# Patient Record
Sex: MALE | Race: BLACK OR AFRICAN AMERICAN | Employment: OTHER | URBAN - METROPOLITAN AREA
[De-identification: names, ages, dates, MRNs, and addresses within clinical notes are randomized per-mention and may not be internally consistent; named-entity substitution may affect disease eponyms.]

---

## 2017-06-03 ENCOUNTER — HOSPITAL ENCOUNTER (OUTPATIENT)
Dept: LAB | Age: 67
Discharge: HOME | End: 2017-06-03
Payer: MEDICARE

## 2017-06-03 PROCEDURE — 36415 COLL VENOUS BLD VENIPUNCTURE: CPT

## 2017-06-03 PROCEDURE — G0103 PSA SCREENING: HCPCS

## 2017-06-03 PROCEDURE — 80048 BASIC METABOLIC PNL TOTAL CA: CPT

## 2017-06-03 PROCEDURE — 80061 LIPID PANEL: CPT

## 2017-06-27 ENCOUNTER — HOSPITAL ENCOUNTER (OUTPATIENT)
Age: 67
Setting detail: OUTPATIENT SURGERY
Discharge: HOME | End: 2017-06-27
Attending: SPECIALIST | Admitting: SPECIALIST
Payer: MEDICARE

## 2017-06-27 ENCOUNTER — ANESTHESIA EVENT (OUTPATIENT)
Dept: ENDOSCOPY | Age: 67
End: 2017-06-27
Payer: MEDICARE

## 2017-06-27 ENCOUNTER — ANESTHESIA (OUTPATIENT)
Dept: ENDOSCOPY | Age: 67
End: 2017-06-27
Payer: MEDICARE

## 2017-06-27 VITALS
RESPIRATION RATE: 19 BRPM | OXYGEN SATURATION: 98 % | SYSTOLIC BLOOD PRESSURE: 115 MMHG | DIASTOLIC BLOOD PRESSURE: 76 MMHG

## 2017-06-27 VITALS
HEIGHT: 66 IN | HEART RATE: 73 BPM | BODY MASS INDEX: 27.97 KG/M2 | SYSTOLIC BLOOD PRESSURE: 129 MMHG | DIASTOLIC BLOOD PRESSURE: 89 MMHG | TEMPERATURE: 98.3 F | OXYGEN SATURATION: 97 % | WEIGHT: 174 LBS | RESPIRATION RATE: 20 BRPM

## 2017-06-27 PROCEDURE — 88305 TISSUE EXAM BY PATHOLOGIST: CPT

## 2017-06-27 PROCEDURE — 2500000003 HC RX 250 WO HCPCS: Performed by: NURSE ANESTHETIST, CERTIFIED REGISTERED

## 2017-06-27 PROCEDURE — 3700000000 HC ANESTHESIA ATTENDED CARE: Performed by: SPECIALIST

## 2017-06-27 PROCEDURE — 2580000003 HC RX 258: Performed by: SPECIALIST

## 2017-06-27 PROCEDURE — 3609027000 HC COLONOSCOPY: Performed by: SPECIALIST

## 2017-06-27 PROCEDURE — 6360000002 HC RX W HCPCS: Performed by: NURSE ANESTHETIST, CERTIFIED REGISTERED

## 2017-06-27 PROCEDURE — 7100000010 HC PHASE II RECOVERY - FIRST 15 MIN: Performed by: SPECIALIST

## 2017-06-27 RX ORDER — LOVASTATIN 40 MG/1
40 TABLET ORAL NIGHTLY
COMMUNITY

## 2017-06-27 RX ORDER — SODIUM CHLORIDE 9 MG/ML
INJECTION, SOLUTION INTRAVENOUS CONTINUOUS
Status: DISCONTINUED | OUTPATIENT
Start: 2017-06-27 | End: 2017-06-27 | Stop reason: HOSPADM

## 2017-06-27 RX ORDER — PROPOFOL 10 MG/ML
INJECTION, EMULSION INTRAVENOUS PRN
Status: DISCONTINUED | OUTPATIENT
Start: 2017-06-27 | End: 2017-06-27 | Stop reason: SDUPTHER

## 2017-06-27 RX ORDER — LIDOCAINE HYDROCHLORIDE 20 MG/ML
INJECTION, SOLUTION INFILTRATION; PERINEURAL PRN
Status: DISCONTINUED | OUTPATIENT
Start: 2017-06-27 | End: 2017-06-27 | Stop reason: SDUPTHER

## 2017-06-27 RX ORDER — LOSARTAN POTASSIUM 50 MG/1
50 TABLET ORAL DAILY
COMMUNITY

## 2017-06-27 RX ORDER — MULTIVITAMIN WITH IRON
100 TABLET ORAL DAILY
COMMUNITY

## 2017-06-27 RX ORDER — LIDOCAINE HYDROCHLORIDE 10 MG/ML
1 INJECTION, SOLUTION EPIDURAL; INFILTRATION; INTRACAUDAL; PERINEURAL
Status: DISCONTINUED | OUTPATIENT
Start: 2017-06-27 | End: 2017-06-27 | Stop reason: HOSPADM

## 2017-06-27 RX ORDER — SODIUM CHLORIDE 0.9 % (FLUSH) 0.9 %
10 SYRINGE (ML) INJECTION EVERY 12 HOURS SCHEDULED
Status: DISCONTINUED | OUTPATIENT
Start: 2017-06-27 | End: 2017-06-27 | Stop reason: HOSPADM

## 2017-06-27 RX ORDER — SODIUM CHLORIDE 0.9 % (FLUSH) 0.9 %
10 SYRINGE (ML) INJECTION PRN
Status: DISCONTINUED | OUTPATIENT
Start: 2017-06-27 | End: 2017-06-27 | Stop reason: HOSPADM

## 2017-06-27 RX ADMIN — SODIUM CHLORIDE: 9 INJECTION, SOLUTION INTRAVENOUS at 12:03

## 2017-06-27 RX ADMIN — PROPOFOL 20 MG: 10 INJECTION, EMULSION INTRAVENOUS at 13:16

## 2017-06-27 RX ADMIN — PROPOFOL 20 MG: 10 INJECTION, EMULSION INTRAVENOUS at 13:18

## 2017-06-27 RX ADMIN — PROPOFOL 20 MG: 10 INJECTION, EMULSION INTRAVENOUS at 13:22

## 2017-06-27 RX ADMIN — LIDOCAINE HYDROCHLORIDE 20 MG: 20 INJECTION, SOLUTION INFILTRATION; PERINEURAL at 13:14

## 2017-06-27 RX ADMIN — PROPOFOL 20 MG: 10 INJECTION, EMULSION INTRAVENOUS at 13:20

## 2017-06-27 RX ADMIN — PROPOFOL 30 MG: 10 INJECTION, EMULSION INTRAVENOUS at 13:14

## 2017-06-27 ASSESSMENT — PAIN - FUNCTIONAL ASSESSMENT: PAIN_FUNCTIONAL_ASSESSMENT: 0-10

## 2017-06-28 ENCOUNTER — HOSPITAL ENCOUNTER (OUTPATIENT)
Dept: LAB | Age: 67
Discharge: HOME | End: 2017-06-28
Payer: MEDICARE

## 2017-06-28 LAB
ANION GAP SERPL CALCULATED.3IONS-SCNC: 13 MEQ/L (ref 7–13)
CHLORIDE BLD-SCNC: 102 MEQ/L (ref 98–107)
CO2: 22 MEQ/L (ref 22–29)
POTASSIUM SERPL-SCNC: 4.2 MEQ/L (ref 3.5–5.1)
SODIUM BLD-SCNC: 137 MEQ/L (ref 132–144)

## 2017-06-28 PROCEDURE — 80051 ELECTROLYTE PANEL: CPT

## 2017-06-28 PROCEDURE — 36415 COLL VENOUS BLD VENIPUNCTURE: CPT

## 2018-08-03 ENCOUNTER — HOSPITAL ENCOUNTER (OUTPATIENT)
Dept: LAB | Age: 68
Discharge: HOME | End: 2018-08-03
Payer: COMMERCIAL

## 2018-08-03 PROCEDURE — 80061 LIPID PANEL: CPT

## 2018-08-03 PROCEDURE — 36415 COLL VENOUS BLD VENIPUNCTURE: CPT

## 2018-08-03 PROCEDURE — 80048 BASIC METABOLIC PNL TOTAL CA: CPT

## 2018-12-02 ENCOUNTER — APPOINTMENT (OUTPATIENT)
Dept: GENERAL RADIOLOGY | Age: 68
End: 2018-12-02
Payer: MEDICARE

## 2018-12-02 ENCOUNTER — HOSPITAL ENCOUNTER (EMERGENCY)
Age: 68
Discharge: HOME | End: 2018-12-02
Attending: EMERGENCY MEDICINE
Payer: MEDICARE

## 2018-12-02 VITALS
HEIGHT: 66 IN | TEMPERATURE: 97.8 F | HEART RATE: 86 BPM | DIASTOLIC BLOOD PRESSURE: 98 MMHG | RESPIRATION RATE: 18 BRPM | OXYGEN SATURATION: 97 % | WEIGHT: 170 LBS | BODY MASS INDEX: 27.32 KG/M2 | SYSTOLIC BLOOD PRESSURE: 145 MMHG

## 2018-12-02 DIAGNOSIS — S61.209A OPEN WOUND OF FINGER, INITIAL ENCOUNTER: ICD-10-CM

## 2018-12-02 DIAGNOSIS — S62.639G: Primary | ICD-10-CM

## 2018-12-02 LAB
ALBUMIN SERPL-MCNC: 4.2 G/DL (ref 3.9–4.9)
ALP BLD-CCNC: 90 U/L (ref 35–104)
ALT SERPL-CCNC: 20 U/L (ref 0–41)
ANION GAP SERPL CALCULATED.3IONS-SCNC: 14 MEQ/L (ref 7–13)
AST SERPL-CCNC: 18 U/L (ref 0–40)
BASOPHILS ABSOLUTE: 0 K/UL (ref 0–0.2)
BASOPHILS RELATIVE PERCENT: 0.2 %
BILIRUB SERPL-MCNC: 0.7 MG/DL (ref 0–1.2)
BUN BLDV-MCNC: 17 MG/DL (ref 8–23)
CALCIUM SERPL-MCNC: 9.5 MG/DL (ref 8.6–10.2)
CHLORIDE BLD-SCNC: 103 MEQ/L (ref 98–107)
CO2: 22 MEQ/L (ref 22–29)
CREAT SERPL-MCNC: 1.09 MG/DL (ref 0.7–1.2)
EOSINOPHILS ABSOLUTE: 0.2 K/UL (ref 0–0.7)
EOSINOPHILS RELATIVE PERCENT: 2.5 %
GFR AFRICAN AMERICAN: >60
GFR NON-AFRICAN AMERICAN: >60
GLOBULIN: 3.9 G/DL (ref 2.3–3.5)
GLUCOSE BLD-MCNC: 122 MG/DL (ref 74–109)
HCT VFR BLD CALC: 42.9 % (ref 42–52)
HEMOGLOBIN: 14 G/DL (ref 14–18)
LACTIC ACID: 0.9 MMOL/L (ref 0.5–2.2)
LYMPHOCYTES ABSOLUTE: 1.7 K/UL (ref 1–4.8)
LYMPHOCYTES RELATIVE PERCENT: 18.6 %
MAGNESIUM: 2.1 MG/DL (ref 1.7–2.3)
MCH RBC QN AUTO: 30.1 PG (ref 27–31.3)
MCHC RBC AUTO-ENTMCNC: 32.6 % (ref 33–37)
MCV RBC AUTO: 92.2 FL (ref 80–100)
MONOCYTES ABSOLUTE: 0.7 K/UL (ref 0.2–0.8)
MONOCYTES RELATIVE PERCENT: 7.1 %
NEUTROPHILS ABSOLUTE: 6.7 K/UL (ref 1.4–6.5)
NEUTROPHILS RELATIVE PERCENT: 71.6 %
PDW BLD-RTO: 14.8 % (ref 11.5–14.5)
PLATELET # BLD: 241 K/UL (ref 130–400)
POTASSIUM SERPL-SCNC: 3.9 MEQ/L (ref 3.5–5.1)
RBC # BLD: 4.66 M/UL (ref 4.7–6.1)
SODIUM BLD-SCNC: 139 MEQ/L (ref 132–144)
TOTAL PROTEIN: 8.1 G/DL (ref 6.4–8.1)
WBC # BLD: 9.3 K/UL (ref 4.8–10.8)

## 2018-12-02 PROCEDURE — 36415 COLL VENOUS BLD VENIPUNCTURE: CPT

## 2018-12-02 PROCEDURE — 2580000003 HC RX 258: Performed by: EMERGENCY MEDICINE

## 2018-12-02 PROCEDURE — 99283 EMERGENCY DEPT VISIT LOW MDM: CPT

## 2018-12-02 PROCEDURE — 6370000000 HC RX 637 (ALT 250 FOR IP): Performed by: EMERGENCY MEDICINE

## 2018-12-02 PROCEDURE — 83605 ASSAY OF LACTIC ACID: CPT

## 2018-12-02 PROCEDURE — 73140 X-RAY EXAM OF FINGER(S): CPT

## 2018-12-02 PROCEDURE — 87205 SMEAR GRAM STAIN: CPT

## 2018-12-02 PROCEDURE — 87077 CULTURE AEROBIC IDENTIFY: CPT

## 2018-12-02 PROCEDURE — 85025 COMPLETE CBC W/AUTO DIFF WBC: CPT

## 2018-12-02 PROCEDURE — 83735 ASSAY OF MAGNESIUM: CPT

## 2018-12-02 PROCEDURE — 87075 CULTR BACTERIA EXCEPT BLOOD: CPT

## 2018-12-02 PROCEDURE — 80053 COMPREHEN METABOLIC PANEL: CPT

## 2018-12-02 PROCEDURE — 87070 CULTURE OTHR SPECIMN AEROBIC: CPT

## 2018-12-02 PROCEDURE — 87147 CULTURE TYPE IMMUNOLOGIC: CPT

## 2018-12-02 PROCEDURE — 87186 SC STD MICRODIL/AGAR DIL: CPT

## 2018-12-02 PROCEDURE — 87040 BLOOD CULTURE FOR BACTERIA: CPT

## 2018-12-02 RX ORDER — SULFAMETHOXAZOLE AND TRIMETHOPRIM 800; 160 MG/1; MG/1
1 TABLET ORAL ONCE
Status: COMPLETED | OUTPATIENT
Start: 2018-12-02 | End: 2018-12-02

## 2018-12-02 RX ORDER — IBUPROFEN 200 MG
TABLET ORAL
Qty: 28 G | Refills: 1 | Status: SHIPPED | OUTPATIENT
Start: 2018-12-02

## 2018-12-02 RX ORDER — SULFAMETHOXAZOLE AND TRIMETHOPRIM 800; 160 MG/1; MG/1
1 TABLET ORAL 2 TIMES DAILY
Qty: 20 TABLET | Refills: 0 | Status: SHIPPED | OUTPATIENT
Start: 2018-12-02 | End: 2018-12-12

## 2018-12-02 RX ORDER — 0.9 % SODIUM CHLORIDE 0.9 %
1000 INTRAVENOUS SOLUTION INTRAVENOUS ONCE
Status: COMPLETED | OUTPATIENT
Start: 2018-12-02 | End: 2018-12-02

## 2018-12-02 RX ORDER — CHLORHEXIDINE GLUCONATE 4 G/100ML
SOLUTION TOPICAL
Qty: 473 ML | Refills: 0 | Status: SHIPPED | OUTPATIENT
Start: 2018-12-02 | End: 2018-12-16

## 2018-12-02 RX ADMIN — SODIUM CHLORIDE 1000 ML: 9 INJECTION, SOLUTION INTRAVENOUS at 16:08

## 2018-12-02 RX ADMIN — SULFAMETHOXAZOLE AND TRIMETHOPRIM 1 TABLET: 800; 160 TABLET ORAL at 18:01

## 2018-12-02 ASSESSMENT — ENCOUNTER SYMPTOMS
DIARRHEA: 0
WHEEZING: 0
APNEA: 0
CONSTIPATION: 0
COLOR CHANGE: 0
EYE PAIN: 0
ABDOMINAL DISTENTION: 0
SHORTNESS OF BREATH: 0
ABDOMINAL PAIN: 0
BACK PAIN: 0
RHINORRHEA: 0
VOMITING: 0
COUGH: 0
SORE THROAT: 0
SINUS PRESSURE: 0
NAUSEA: 0
PHOTOPHOBIA: 0

## 2018-12-02 NOTE — ED PROVIDER NOTES
Negative for dizziness, tremors, syncope, weakness, light-headedness and headaches. Psychiatric/Behavioral: Negative for agitation, confusion and hallucinations. All other systems reviewed and are negative. Except as noted above the remainder of the review of systems was reviewed and negative. PAST MEDICAL HISTORY     Past Medical History:   Diagnosis Date    Hyperlipidemia     Hypertension          SURGICAL HISTORY       Past Surgical History:   Procedure Laterality Date    COLONOSCOPY      JOINT REPLACEMENT Left     hip '08    [place] COLON [place] SCRN NOT  [address] IND N/A 6/27/2017    COLONOSCOPY performed by Angeles Holley MD at [address]       Discharge Medication List as of 12/2/2018  5:52 PM      CONTINUE these medications which have NOT CHANGED    Details   losartan (COZAAR) 50 MG tablet Take 50 mg by mouth dailyHistorical Med      HYDROCHLOROTHIAZIDE PO Take by mouthHistorical Med      aspirin 81 MG tablet Take 81 mg by mouth dailyHistorical Med      lovastatin (MEVACOR) 40 MG tablet Take 40 mg by mouth nightlyHistorical Med      Multiple Vitamins-Minerals (ONE-A-DAY MENS 50+ ADVANTAGE PO) Take by mouthHistorical Med      Naproxen Sodium (ALEVE PO) Take by mouthHistorical Med      Pyridoxine HCl (VITAMIN B-6) 100 MG tablet Take 100 mg by mouth dailyHistorical Med             ALLERGIES     Patient has no known allergies. FAMILY HISTORY     No family history on file.        SOCIAL HISTORY       Social History     Social History    Marital status:      Spouse name: N/A    Number of children: N/A    Years of education: N/A     Social History Main Topics    Smoking status: Current Every Day Smoker     Types: Pipe    Smokeless tobacco: Not on file    Alcohol use No    Drug use: No    Sexual activity: Not on file     Other Topics Concern    Not on file     Social History Narrative    No narrative on file       SCREENINGS             PHYSICAL EXAM #2    Narrative:     ORDER#: [id number]                          ORDERED BY: Kamilah Cosme  SOURCE: Blood                              COLLECTED:  12/02/18 16:00  ANTIBIOTICS AT [facility].:                      RECEIVED :  12/02/18 21:27   MAGNESIUM   LACTIC ACID, PLASMA       All other labs were within normal range or not returned as of this dictation. EMERGENCY DEPARTMENT COURSE and DIFFERENTIALDIAGNOSIS/MDM:   Vitals:    Vitals:    12/02/18 1514 12/02/18 1632 12/02/18 1824   BP: (!) 164/107 (!) 160/100 (!) 145/98   Pulse: 106 86 86   Resp: 16 18 18   Temp: 97.8 °F (36.6 °C)     TempSrc: Oral     SpO2: 97% 96% 97%   Weight: 170 lb (77.1 kg)     Height: 5' 6\" (1.676 m)             MDM  Number of Diagnoses or Management Options     Amount and/or Complexity of Data Reviewed  Clinical lab tests: reviewed and ordered  Tests in the radiology section of CPT®: reviewed and ordered    Risk of Complications, Morbidity, and/or Mortality  Presenting problems: moderate  Diagnostic procedures: moderate  Management options: moderate    Patient Progress  Patient progress: improved      CRITICAL CARE TIME   Total Critical Care time was  minutes, excluding separately reportable procedures. There was a high probability of clinically significant/life threatening deterioration in the patient's condition which required my urgentintervention. CONSULTS:  None    PROCEDURES:  Unless otherwise noted below, none     Procedures    FINAL IMPRESSION      1. Open fracture of tuft of distal phalanx of finger with delayed healing    2.  Open wound of finger, initial encounter          DISPOSITION/PLAN   DISPOSITION Decision To Discharge 12/02/2018 05:48:38 PM      PATIENT REFERRED TO:  Rony Winters MD  [address] [address] [phone]    In 3 days      Jimmy Beltre MD  [address] [place] [address]    In 1 day        DISCHARGE MEDICATIONS:  Discharge Medication List as of 12/2/2018  5:52 PM

## 2018-12-02 NOTE — ED NOTES
Pt middle finger swollen and draining from tip of finger.  Pt denies pain at this time     Huseyin Shea RN  12/02/18 3619

## 2018-12-05 LAB
ANAEROBIC CULTURE: ABNORMAL
GRAM STAIN RESULT: ABNORMAL
ORGANISM: ABNORMAL
WOUND/ABSCESS: ABNORMAL

## 2018-12-08 LAB
BLOOD CULTURE, ROUTINE: NORMAL
CULTURE, BLOOD 2: NORMAL

## 2018-12-24 ENCOUNTER — HOSPITAL ENCOUNTER (OUTPATIENT)
Dept: ORTHOPEDIC SURGERY | Age: 68
Discharge: HOME | End: 2018-12-26
Payer: MEDICARE

## 2018-12-24 DIAGNOSIS — S62.663D CLOSED NONDISPLACED FRACTURE OF DISTAL PHALANX OF LEFT MIDDLE FINGER WITH ROUTINE HEALING, SUBSEQUENT ENCOUNTER: ICD-10-CM

## 2018-12-24 PROCEDURE — 73140 X-RAY EXAM OF FINGER(S): CPT

## 2019-01-02 ENCOUNTER — ANESTHESIA (OUTPATIENT)
Dept: OPERATING ROOM | Age: 69
End: 2019-01-02
Payer: MEDICARE

## 2019-01-02 ENCOUNTER — ANESTHESIA EVENT (OUTPATIENT)
Dept: OPERATING ROOM | Age: 69
End: 2019-01-02
Payer: MEDICARE

## 2019-01-02 ENCOUNTER — HOSPITAL ENCOUNTER (OUTPATIENT)
Age: 69
Setting detail: OUTPATIENT SURGERY
Discharge: HOME | End: 2019-01-02
Attending: ORTHOPAEDIC SURGERY | Admitting: ORTHOPAEDIC SURGERY
Payer: MEDICARE

## 2019-01-02 ENCOUNTER — APPOINTMENT (OUTPATIENT)
Dept: GENERAL RADIOLOGY | Age: 69
End: 2019-01-02
Attending: ORTHOPAEDIC SURGERY
Payer: MEDICARE

## 2019-01-02 VITALS — SYSTOLIC BLOOD PRESSURE: 163 MMHG | OXYGEN SATURATION: 97 % | DIASTOLIC BLOOD PRESSURE: 106 MMHG

## 2019-01-02 VITALS
RESPIRATION RATE: 16 BRPM | DIASTOLIC BLOOD PRESSURE: 109 MMHG | OXYGEN SATURATION: 97 % | WEIGHT: 187 LBS | TEMPERATURE: 98.7 F | SYSTOLIC BLOOD PRESSURE: 160 MMHG | HEIGHT: 66 IN | HEART RATE: 98 BPM | BODY MASS INDEX: 30.05 KG/M2

## 2019-01-02 PROCEDURE — 3700000001 HC ADD 15 MINUTES (ANESTHESIA): Performed by: ORTHOPAEDIC SURGERY

## 2019-01-02 PROCEDURE — 3600000002 HC SURGERY LEVEL 2 BASE: Performed by: ORTHOPAEDIC SURGERY

## 2019-01-02 PROCEDURE — 88311 DECALCIFY TISSUE: CPT

## 2019-01-02 PROCEDURE — 2580000003 HC RX 258: Performed by: ANESTHESIOLOGY

## 2019-01-02 PROCEDURE — 7100000011 HC PHASE II RECOVERY - ADDTL 15 MIN: Performed by: ORTHOPAEDIC SURGERY

## 2019-01-02 PROCEDURE — 87075 CULTR BACTERIA EXCEPT BLOOD: CPT

## 2019-01-02 PROCEDURE — 3700000000 HC ANESTHESIA ATTENDED CARE: Performed by: ORTHOPAEDIC SURGERY

## 2019-01-02 PROCEDURE — 87205 SMEAR GRAM STAIN: CPT

## 2019-01-02 PROCEDURE — 2709999900 HC NON-CHARGEABLE SUPPLY: Performed by: ORTHOPAEDIC SURGERY

## 2019-01-02 PROCEDURE — 87070 CULTURE OTHR SPECIMN AEROBIC: CPT

## 2019-01-02 PROCEDURE — 2500000003 HC RX 250 WO HCPCS: Performed by: NURSE ANESTHETIST, CERTIFIED REGISTERED

## 2019-01-02 PROCEDURE — 2580000003 HC RX 258: Performed by: ORTHOPAEDIC SURGERY

## 2019-01-02 PROCEDURE — 7100000010 HC PHASE II RECOVERY - FIRST 15 MIN: Performed by: ORTHOPAEDIC SURGERY

## 2019-01-02 PROCEDURE — 88305 TISSUE EXAM BY PATHOLOGIST: CPT

## 2019-01-02 PROCEDURE — 6360000002 HC RX W HCPCS: Performed by: ORTHOPAEDIC SURGERY

## 2019-01-02 PROCEDURE — 3600000012 HC SURGERY LEVEL 2 ADDTL 15MIN: Performed by: ORTHOPAEDIC SURGERY

## 2019-01-02 RX ORDER — LIDOCAINE HYDROCHLORIDE 10 MG/ML
1 INJECTION, SOLUTION EPIDURAL; INFILTRATION; INTRACAUDAL; PERINEURAL
Status: DISCONTINUED | OUTPATIENT
Start: 2019-01-02 | End: 2019-01-02 | Stop reason: HOSPADM

## 2019-01-02 RX ORDER — MEPERIDINE HYDROCHLORIDE 25 MG/ML
12.5 INJECTION INTRAMUSCULAR; INTRAVENOUS; SUBCUTANEOUS EVERY 5 MIN PRN
Status: DISCONTINUED | OUTPATIENT
Start: 2019-01-02 | End: 2019-01-02 | Stop reason: HOSPADM

## 2019-01-02 RX ORDER — CEFAZOLIN SODIUM 2 G/50ML
2 SOLUTION INTRAVENOUS
Status: COMPLETED | OUTPATIENT
Start: 2019-01-02 | End: 2019-01-02

## 2019-01-02 RX ORDER — FENTANYL CITRATE 50 UG/ML
50 INJECTION, SOLUTION INTRAMUSCULAR; INTRAVENOUS EVERY 10 MIN PRN
Status: DISCONTINUED | OUTPATIENT
Start: 2019-01-02 | End: 2019-01-02 | Stop reason: HOSPADM

## 2019-01-02 RX ORDER — LABETALOL HYDROCHLORIDE 5 MG/ML
INJECTION, SOLUTION INTRAVENOUS PRN
Status: DISCONTINUED | OUTPATIENT
Start: 2019-01-02 | End: 2019-01-02 | Stop reason: SDUPTHER

## 2019-01-02 RX ORDER — HYDROCODONE BITARTRATE AND ACETAMINOPHEN 5; 325 MG/1; MG/1
2 TABLET ORAL PRN
Status: DISCONTINUED | OUTPATIENT
Start: 2019-01-02 | End: 2019-01-02 | Stop reason: HOSPADM

## 2019-01-02 RX ORDER — METOCLOPRAMIDE HYDROCHLORIDE 5 MG/ML
10 INJECTION INTRAMUSCULAR; INTRAVENOUS
Status: DISCONTINUED | OUTPATIENT
Start: 2019-01-02 | End: 2019-01-02 | Stop reason: HOSPADM

## 2019-01-02 RX ORDER — HYDROCODONE BITARTRATE AND ACETAMINOPHEN 5; 325 MG/1; MG/1
1 TABLET ORAL PRN
Status: DISCONTINUED | OUTPATIENT
Start: 2019-01-02 | End: 2019-01-02 | Stop reason: HOSPADM

## 2019-01-02 RX ORDER — DIPHENHYDRAMINE HYDROCHLORIDE 50 MG/ML
12.5 INJECTION INTRAMUSCULAR; INTRAVENOUS
Status: DISCONTINUED | OUTPATIENT
Start: 2019-01-02 | End: 2019-01-02 | Stop reason: HOSPADM

## 2019-01-02 RX ORDER — LIDOCAINE HYDROCHLORIDE 10 MG/ML
INJECTION, SOLUTION INFILTRATION; PERINEURAL
Status: DISCONTINUED
Start: 2019-01-02 | End: 2019-01-02 | Stop reason: HOSPADM

## 2019-01-02 RX ORDER — ONDANSETRON 2 MG/ML
4 INJECTION INTRAMUSCULAR; INTRAVENOUS
Status: DISCONTINUED | OUTPATIENT
Start: 2019-01-02 | End: 2019-01-02 | Stop reason: HOSPADM

## 2019-01-02 RX ORDER — MAGNESIUM HYDROXIDE 1200 MG/15ML
LIQUID ORAL CONTINUOUS PRN
Status: DISCONTINUED | OUTPATIENT
Start: 2019-01-02 | End: 2019-01-02 | Stop reason: HOSPADM

## 2019-01-02 RX ORDER — SODIUM CHLORIDE, SODIUM LACTATE, POTASSIUM CHLORIDE, CALCIUM CHLORIDE 600; 310; 30; 20 MG/100ML; MG/100ML; MG/100ML; MG/100ML
INJECTION, SOLUTION INTRAVENOUS CONTINUOUS
Status: DISCONTINUED | OUTPATIENT
Start: 2019-01-02 | End: 2019-01-02 | Stop reason: HOSPADM

## 2019-01-02 RX ADMIN — LABETALOL HYDROCHLORIDE 5 MG: 5 INJECTION, SOLUTION INTRAVENOUS at 08:16

## 2019-01-02 RX ADMIN — CEFAZOLIN SODIUM 2 G: 2 SOLUTION INTRAVENOUS at 08:05

## 2019-01-02 RX ADMIN — LABETALOL HYDROCHLORIDE 5 MG: 5 INJECTION, SOLUTION INTRAVENOUS at 08:30

## 2019-01-02 RX ADMIN — SODIUM CHLORIDE, POTASSIUM CHLORIDE, SODIUM LACTATE AND CALCIUM CHLORIDE: 600; 310; 30; 20 INJECTION, SOLUTION INTRAVENOUS at 07:10

## 2019-01-02 ASSESSMENT — PULMONARY FUNCTION TESTS
PIF_VALUE: 0
PIF_VALUE: 1
PIF_VALUE: 0
PIF_VALUE: 1
PIF_VALUE: 0

## 2019-01-02 ASSESSMENT — PAIN - FUNCTIONAL ASSESSMENT: PAIN_FUNCTIONAL_ASSESSMENT: 0-10

## 2019-01-02 ASSESSMENT — PAIN SCALES - GENERAL: PAINLEVEL_OUTOF10: 0

## 2019-01-05 LAB
ANAEROBIC CULTURE: NORMAL
CULTURE SURGICAL: NORMAL
GRAM STAIN RESULT: NORMAL

## 2019-01-14 ENCOUNTER — HOSPITAL ENCOUNTER (OUTPATIENT)
Dept: ORTHOPEDIC SURGERY | Age: 69
Discharge: HOME | End: 2019-01-16
Payer: MEDICARE

## 2019-01-14 DIAGNOSIS — S62.609D CLOSED FRACTURE OF MULTIPLE PHALANGES OF DIGIT OF HAND WITH ROUTINE HEALING, SUBSEQUENT ENCOUNTER: ICD-10-CM

## 2019-01-14 PROCEDURE — 73140 X-RAY EXAM OF FINGER(S): CPT

## 2019-01-28 ENCOUNTER — HOSPITAL ENCOUNTER (OUTPATIENT)
Dept: ORTHOPEDIC SURGERY | Age: 69
Discharge: HOME | End: 2019-01-30
Payer: MEDICARE

## 2019-01-28 DIAGNOSIS — S62.603D CLOSED NONDISPLACED FRACTURE OF PHALANX OF LEFT MIDDLE FINGER WITH ROUTINE HEALING, UNSPECIFIED PHALANX, SUBSEQUENT ENCOUNTER: ICD-10-CM

## 2019-01-28 PROCEDURE — 73140 X-RAY EXAM OF FINGER(S): CPT

## 2019-07-10 ENCOUNTER — HOSPITAL ENCOUNTER (OUTPATIENT)
Dept: LAB | Age: 69
Discharge: HOME | End: 2019-07-10
Payer: MEDICARE

## 2019-07-10 LAB
ANION GAP SERPL CALCULATED.3IONS-SCNC: 16 MEQ/L (ref 9–15)
BUN BLDV-MCNC: 19 MG/DL (ref 8–23)
CALCIUM SERPL-MCNC: 9.5 MG/DL (ref 8.5–9.9)
CHLORIDE BLD-SCNC: 105 MEQ/L (ref 95–107)
CHOLESTEROL, TOTAL: 149 MG/DL (ref 0–199)
CO2: 20 MEQ/L (ref 20–31)
CREAT SERPL-MCNC: 0.91 MG/DL (ref 0.7–1.2)
GFR AFRICAN AMERICAN: >60
GFR NON-AFRICAN AMERICAN: >60
GLUCOSE BLD-MCNC: 112 MG/DL (ref 70–99)
HDLC SERPL-MCNC: 50 MG/DL (ref 40–59)
LDL CHOLESTEROL CALCULATED: 76 MG/DL (ref 0–129)
POTASSIUM SERPL-SCNC: 3.7 MEQ/L (ref 3.4–4.9)
SODIUM BLD-SCNC: 141 MEQ/L (ref 135–144)
TRIGL SERPL-MCNC: 116 MG/DL (ref 0–150)

## 2019-07-10 PROCEDURE — 80061 LIPID PANEL: CPT

## 2019-07-10 PROCEDURE — 36415 COLL VENOUS BLD VENIPUNCTURE: CPT

## 2019-07-10 PROCEDURE — 80048 BASIC METABOLIC PNL TOTAL CA: CPT

## 2020-10-16 ENCOUNTER — HOSPITAL ENCOUNTER (OUTPATIENT)
Dept: LAB | Age: 70
Discharge: HOME | End: 2020-10-16
Payer: MEDICARE

## 2020-10-16 LAB
ALBUMIN SERPL-MCNC: 4.5 G/DL (ref 3.5–4.6)
ALP BLD-CCNC: 87 U/L (ref 35–104)
ALT SERPL-CCNC: 10 U/L (ref 0–41)
ANION GAP SERPL CALCULATED.3IONS-SCNC: 12 MEQ/L (ref 9–15)
AST SERPL-CCNC: 17 U/L (ref 0–40)
BILIRUB SERPL-MCNC: 1.5 MG/DL (ref 0.2–0.7)
BUN BLDV-MCNC: 13 MG/DL (ref 8–23)
CALCIUM SERPL-MCNC: 9.4 MG/DL (ref 8.5–9.9)
CHLORIDE BLD-SCNC: 107 MEQ/L (ref 95–107)
CHOLESTEROL, TOTAL: 158 MG/DL (ref 0–199)
CO2: 20 MEQ/L (ref 20–31)
CREAT SERPL-MCNC: 0.89 MG/DL (ref 0.7–1.2)
GFR AFRICAN AMERICAN: >60
GFR NON-AFRICAN AMERICAN: >60
GLOBULIN: 3.1 G/DL (ref 2.3–3.5)
GLUCOSE BLD-MCNC: 79 MG/DL (ref 70–99)
HDLC SERPL-MCNC: 57 MG/DL (ref 40–59)
LDL CHOLESTEROL CALCULATED: 84 MG/DL (ref 0–129)
POTASSIUM SERPL-SCNC: 4.1 MEQ/L (ref 3.4–4.9)
SODIUM BLD-SCNC: 139 MEQ/L (ref 135–144)
TOTAL PROTEIN: 7.6 G/DL (ref 6.3–8)
TRIGL SERPL-MCNC: 87 MG/DL (ref 0–150)

## 2020-10-16 PROCEDURE — 36415 COLL VENOUS BLD VENIPUNCTURE: CPT

## 2020-10-16 PROCEDURE — 80061 LIPID PANEL: CPT

## 2020-10-16 PROCEDURE — 80053 COMPREHEN METABOLIC PANEL: CPT

## 2021-02-08 ENCOUNTER — HOSPITAL ENCOUNTER (EMERGENCY)
Age: 71
Discharge: HOME | End: 2021-02-08
Attending: EMERGENCY MEDICINE
Payer: MEDICARE

## 2021-02-08 VITALS
DIASTOLIC BLOOD PRESSURE: 104 MMHG | WEIGHT: 170 LBS | SYSTOLIC BLOOD PRESSURE: 154 MMHG | HEIGHT: 66 IN | RESPIRATION RATE: 16 BRPM | OXYGEN SATURATION: 97 % | BODY MASS INDEX: 27.32 KG/M2 | TEMPERATURE: 98.2 F | HEART RATE: 110 BPM

## 2021-02-08 DIAGNOSIS — S01.01XA LACERATION OF SCALP, INITIAL ENCOUNTER: Primary | ICD-10-CM

## 2021-02-08 DIAGNOSIS — S09.90XA INJURY OF HEAD, INITIAL ENCOUNTER: ICD-10-CM

## 2021-02-08 PROCEDURE — 90471 IMMUNIZATION ADMIN: CPT | Performed by: EMERGENCY MEDICINE

## 2021-02-08 PROCEDURE — 12002 RPR S/N/AX/GEN/TRNK2.6-7.5CM: CPT

## 2021-02-08 PROCEDURE — 6360000002 HC RX W HCPCS: Performed by: EMERGENCY MEDICINE

## 2021-02-08 PROCEDURE — 2500000003 HC RX 250 WO HCPCS: Performed by: EMERGENCY MEDICINE

## 2021-02-08 PROCEDURE — 99283 EMERGENCY DEPT VISIT LOW MDM: CPT

## 2021-02-08 PROCEDURE — 90715 TDAP VACCINE 7 YRS/> IM: CPT | Performed by: EMERGENCY MEDICINE

## 2021-02-08 RX ORDER — LIDOCAINE HYDROCHLORIDE AND EPINEPHRINE 10; 10 MG/ML; UG/ML
20 INJECTION, SOLUTION INFILTRATION; PERINEURAL ONCE
Status: COMPLETED | OUTPATIENT
Start: 2021-02-08 | End: 2021-02-08

## 2021-02-08 RX ORDER — IBUPROFEN 600 MG/1
600 TABLET ORAL EVERY 8 HOURS PRN
Qty: 30 TABLET | Refills: 0 | Status: SHIPPED | OUTPATIENT
Start: 2021-02-08

## 2021-02-08 RX ORDER — DIAPER,BRIEF,INFANT-TODD,DISP
EACH MISCELLANEOUS ONCE
Status: DISCONTINUED | OUTPATIENT
Start: 2021-02-08 | End: 2021-02-08 | Stop reason: HOSPADM

## 2021-02-08 RX ORDER — MAGNESIUM HYDROXIDE 1200 MG/15ML
250 LIQUID ORAL CONTINUOUS
Status: DISCONTINUED | OUTPATIENT
Start: 2021-02-08 | End: 2021-02-08 | Stop reason: HOSPADM

## 2021-02-08 RX ADMIN — TETANUS TOXOID, REDUCED DIPHTHERIA TOXOID AND ACELLULAR PERTUSSIS VACCINE, ADSORBED 0.5 ML: 5; 2.5; 8; 8; 2.5 SUSPENSION INTRAMUSCULAR at 13:49

## 2021-02-08 RX ADMIN — LIDOCAINE HYDROCHLORIDE,EPINEPHRINE BITARTRATE 20 ML: 10; .01 INJECTION, SOLUTION INFILTRATION; PERINEURAL at 13:48

## 2021-02-08 ASSESSMENT — ENCOUNTER SYMPTOMS
BACK PAIN: 0
EYE PAIN: 0
FACIAL SWELLING: 0
CHEST TIGHTNESS: 0
CHOKING: 0
WHEEZING: 0
ABDOMINAL PAIN: 0
DIARRHEA: 0
EYE DISCHARGE: 0
STRIDOR: 0
VOICE CHANGE: 0
SORE THROAT: 0
TROUBLE SWALLOWING: 0
COUGH: 0
VOMITING: 0
BLOOD IN STOOL: 0
CONSTIPATION: 0
EYE REDNESS: 0
SHORTNESS OF BREATH: 0
SINUS PRESSURE: 0

## 2021-02-08 NOTE — ED PROVIDER NOTES
and rash. Neurological: Negative for tremors, seizures, syncope, weakness, numbness and headaches. Hematological: Negative for adenopathy. Does not bruise/bleed easily. Psychiatric/Behavioral: Negative for agitation, behavioral problems, hallucinations and sleep disturbance. The patient is not hyperactive. All other systems reviewed and are negative. Except as noted above the remainder of the review of systems was reviewed and negative. PAST MEDICAL HISTORY     Past Medical History:   Diagnosis Date    Hyperlipidemia     Hypertension          SURGICALHISTORY       Past Surgical History:   Procedure Laterality Date    COLONOSCOPY      FINGER AMPUTATION Left 1/2/2019    LEFT HAND: LEFT LONG FINGER AMPUTATION THROUGH DISTAL INTERPHALANGEAL JOINT DIGITAL BLOCK C-ARM MAC+LOCAL (PAT ON ADMIT)   performed by Denis Aragon DO at [address] Left     hip '08    [place] COLON CA SCRN NOT  [address] IND N/A 6/27/2017    COLONOSCOPY performed by Henrietta Jimenez MD at [address]       Discharge Medication List as of 2/8/2021  2:20 PM      CONTINUE these medications which have NOT CHANGED    Details   losartan (COZAAR) 50 MG tablet Take 50 mg by mouth dailyHistorical Med      HYDROCHLOROTHIAZIDE PO Take by mouthHistorical Med      lovastatin (MEVACOR) 40 MG tablet Take 40 mg by mouth nightlyHistorical Med      Multiple Vitamins-Minerals (ONE-A-DAY MENS 50+ ADVANTAGE PO) Take by mouthHistorical Med      Pyridoxine HCl (VITAMIN B-6) 100 MG tablet Take 100 mg by mouth dailyHistorical Med      neomycin-bacitracin-polymyxin (NEOSPORIN) 5-400-5000 ointment Apply topically 4 times daily. , Disp-28 g, R-1, Print      aspirin 81 MG tablet Take 81 mg by mouth dailyHistorical Med      Naproxen Sodium (ALEVE PO) Take by mouthHistorical Med             ALLERGIES     Patient has no known allergies. FAMILY HISTORY     History reviewed. No pertinent family history. SOCIAL HISTORY       Social History     Socioeconomic History    Marital status:      Spouse name: None    Number of children: None    Years of education: None    Highest education level: None   Occupational History    None   Social Needs    Financial resource strain: None    Food insecurity     Worry: None     Inability: None    Transportation needs     Medical: None     Non-medical: None   Tobacco Use    Smoking status: Former Smoker     Types: Pipe     Quit date: 2019     Years since quittin.0    Smokeless tobacco: Never Used   Substance and Sexual Activity    Alcohol use: No    Drug use: No    Sexual activity: None   Lifestyle    Physical activity     Days per week: None     Minutes per session: None    Stress: None   Relationships    Social connections     Talks on phone: None     Gets together: None     Attends Jainism service: None     Active member of club or organization: None     Attends meetings of clubs or organizations: None     Relationship status: None    Intimate partner violence     Fear of current or ex partner: None     Emotionally abused: None     Physically abused: None     Forced sexual activity: None   Other Topics Concern    None   Social History Narrative    None       SCREENINGS      @FLOW([id number])@      PHYSICAL EXAM    (up to 7 for level 4, 8 or more for level 5)     ED Triage Vitals [21 1328]   BP Temp Temp Source Pulse Resp SpO2 Height Weight   (!) 154/104 98.2 °F (36.8 °C) Oral 110 16 97 % 5' 6\" (1.676 m) 170 lb (77.1 kg)       Physical Exam  Vitals signs and nursing note reviewed. Constitutional:       General: He is not in acute distress. Appearance: He is well-developed. He is not ill-appearing or diaphoretic. Comments: Active alert cooperative patient no acute distress on similar questions appropriately moving all extremities   HENT:      Head: Normocephalic.       Comments: Attention come to the scalp patient has a laceration involving the right frontal and parietal area of the scalp bleeding under control no foreign body visible     Right Ear: Tympanic membrane, ear canal and external ear normal.      Left Ear: Tympanic membrane, ear canal and external ear normal.      Nose: Nose normal. No congestion. Mouth/Throat:      Pharynx: No oropharyngeal exudate or posterior oropharyngeal erythema. Eyes:      General:         Left eye: No discharge. Extraocular Movements: Extraocular movements intact. Pupils: Pupils are equal, round, and reactive to light. Neck:      Musculoskeletal: Normal range of motion and neck supple. No neck rigidity or muscular tenderness. Vascular: No carotid bruit. Cardiovascular:      Rate and Rhythm: Normal rate and regular rhythm. Heart sounds: Normal heart sounds. No murmur. No friction rub. No gallop. Pulmonary:      Effort: No respiratory distress. Breath sounds: Normal breath sounds. No stridor. No wheezing. Abdominal:      General: Bowel sounds are normal. There is no distension. Palpations: Abdomen is soft. There is no mass. Tenderness: There is no right CVA tenderness or rebound. Hernia: No hernia is present. Musculoskeletal: Normal range of motion. General: No swelling, tenderness or deformity. Right lower leg: No edema. Lymphadenopathy:      Cervical: No cervical adenopathy. Skin:     General: Skin is warm. Coloration: Skin is not jaundiced. Findings: No bruising, erythema or rash. Neurological:      General: No focal deficit present. Mental Status: He is alert and oriented to person, place, and time. Mental status is at baseline. Sensory: No sensory deficit. Motor: No weakness or abnormal muscle tone. Coordination: Coordination normal.      Gait: Gait normal.   Psychiatric:         Behavior: Behavior normal.         Thought Content:  Thought content normal.         DIAGNOSTIC RESULTS EKG: All EKG's are interpreted by the Emergency Department Physician who either signs or Co-signsthis chart in the absence of a cardiologist.        RADIOLOGY:   Jeanenne Gulling such as CT, Ultrasound and MRI are read by the radiologist. Plain radiographic images are visualized and preliminarily interpreted by the emergency physician with the below findings:        Interpretation per the Radiologist below, if available at the time ofthis note:    No orders to display         ED BEDSIDE ULTRASOUND:   Performed by ED Physician - none    LABS:  Labs Reviewed - No data to display    All other labs were within normal range or not returned as of this dictation. EMERGENCY DEPARTMENT COURSE and DIFFERENTIAL DIAGNOSIS/MDM:   Vitals:    Vitals:    02/08/21 1328   BP: (!) 154/104   Pulse: 110   Resp: 16   Temp: 98.2 °F (36.8 °C)   TempSrc: Oral   SpO2: 97%   Weight: 170 lb (77.1 kg)   Height: 5' 6\" (1.676 m)       MDM    CRITICAL CARE TIME   Total Critical Care time was  minutes, excluding separately reportableprocedures. There was a high probability of clinicallysignificant/life threatening deterioration in the patient's condition which required my urgent intervention. NSULTS:  None    PROCEDURES:  Unless otherwise noted below, none     Lac Repair    Date/Time: 2/8/2021 1:46 PM  Performed by: Cynthia Goncalves MD  Authorized by: Cynthia Goncalves MD     Consent:     Consent obtained:  Verbal and emergent situation    Consent given by:  Patient    Risks discussed:  Infection, need for additional repair, poor wound healing and poor cosmetic result  Anesthesia (see MAR for exact dosages):      Anesthesia method:  Local infiltration    Local anesthetic:  Lidocaine 1% WITH epi  Laceration details:     Location:  Scalp    Scalp location:  R parietal    Length (cm):  3    Depth (mm):  0.2  Repair type:     Repair type:  Simple  Pre-procedure details:     Preparation:  Patient was prepped and draped in usual sterile fashion  Exploration:     Hemostasis achieved with:  Direct pressure    Contaminated: no    Treatment:     Area cleansed with:  Hibiclens and saline  Skin repair:     Repair method:  Staples    Number of staples:  9  Approximation:     Approximation:  Close  Post-procedure details:     Dressing:  Antibiotic ointment    Patient tolerance of procedure: Tolerated well, no immediate complications        FINAL IMPRESSION      1. Laceration of scalp, initial encounter    2.  Injury of head, initial encounter          DISPOSITION/PLAN   DISPOSITION        PATIENT REFERRED TO:  Yahir Florez MD  [address] [place], [place] [id number]    In 1 week  For wound re-check and removal of the staples      DISCHARGE MEDICATIONS:  Discharge Medication List as of 2/8/2021  2:20 PM      START taking these medications    Details   ibuprofen (ADVIL;MOTRIN) 600 MG tablet Take 1 tablet by mouth every 8 hours as needed for Pain, Disp-30 tablet, R-0Print                (Please note that portions of this note were completed with a voice recognition program.  Efforts were made to edit the dictations but occasionally words are mis-transcribed.)    Gina Guerrero MD (electronically signed)  Attending Emergency Physician       Gina Guerrero MD  02/08/21 8929 Claus Lizarraga MD  02/26/21 6009

## 2021-07-19 ENCOUNTER — HOSPITAL ENCOUNTER (EMERGENCY)
Age: 71
Discharge: HOME | End: 2021-07-19
Attending: EMERGENCY MEDICINE
Payer: MEDICARE

## 2021-07-19 VITALS
RESPIRATION RATE: 20 BRPM | TEMPERATURE: 99.4 F | HEART RATE: 112 BPM | OXYGEN SATURATION: 98 % | WEIGHT: 155 LBS | BODY MASS INDEX: 24.91 KG/M2 | SYSTOLIC BLOOD PRESSURE: 154 MMHG | DIASTOLIC BLOOD PRESSURE: 97 MMHG | HEIGHT: 66 IN

## 2021-07-19 DIAGNOSIS — T78.3XXA ANGIOEDEMA, INITIAL ENCOUNTER: Primary | ICD-10-CM

## 2021-07-19 PROCEDURE — 6360000002 HC RX W HCPCS: Performed by: EMERGENCY MEDICINE

## 2021-07-19 PROCEDURE — 2500000003 HC RX 250 WO HCPCS: Performed by: EMERGENCY MEDICINE

## 2021-07-19 PROCEDURE — 2580000003 HC RX 258: Performed by: EMERGENCY MEDICINE

## 2021-07-19 PROCEDURE — 96375 TX/PRO/DX INJ NEW DRUG ADDON: CPT

## 2021-07-19 PROCEDURE — 96374 THER/PROPH/DIAG INJ IV PUSH: CPT

## 2021-07-19 PROCEDURE — 99284 EMERGENCY DEPT VISIT MOD MDM: CPT

## 2021-07-19 RX ORDER — SODIUM CHLORIDE 0.9 % (FLUSH) 0.9 %
3 SYRINGE (ML) INJECTION EVERY 8 HOURS
Status: DISCONTINUED | OUTPATIENT
Start: 2021-07-19 | End: 2021-07-19 | Stop reason: HOSPADM

## 2021-07-19 RX ORDER — DIPHENHYDRAMINE HCL 25 MG
25 CAPSULE ORAL EVERY 6 HOURS PRN
Qty: 20 CAPSULE | Refills: 0 | Status: SHIPPED | OUTPATIENT
Start: 2021-07-19 | End: 2021-10-07

## 2021-07-19 RX ORDER — DIPHENHYDRAMINE HYDROCHLORIDE 50 MG/ML
50 INJECTION INTRAMUSCULAR; INTRAVENOUS ONCE
Status: COMPLETED | OUTPATIENT
Start: 2021-07-19 | End: 2021-07-19

## 2021-07-19 RX ORDER — PREDNISONE 20 MG/1
40 TABLET ORAL DAILY
Qty: 10 TABLET | Refills: 0 | Status: SHIPPED | OUTPATIENT
Start: 2021-07-19 | End: 2021-07-24

## 2021-07-19 RX ORDER — METHYLPREDNISOLONE SODIUM SUCCINATE 125 MG/2ML
125 INJECTION, POWDER, LYOPHILIZED, FOR SOLUTION INTRAMUSCULAR; INTRAVENOUS ONCE
Status: COMPLETED | OUTPATIENT
Start: 2021-07-19 | End: 2021-07-19

## 2021-07-19 RX ADMIN — METHYLPREDNISOLONE SODIUM SUCCINATE 125 MG: 125 INJECTION, POWDER, FOR SOLUTION INTRAMUSCULAR; INTRAVENOUS at 11:54

## 2021-07-19 RX ADMIN — Medication 3 ML: at 11:54

## 2021-07-19 RX ADMIN — FAMOTIDINE 20 MG: 10 INJECTION, SOLUTION INTRAVENOUS at 11:54

## 2021-07-19 RX ADMIN — DIPHENHYDRAMINE HYDROCHLORIDE 50 MG: 50 INJECTION INTRAMUSCULAR; INTRAVENOUS at 11:53

## 2021-07-19 ASSESSMENT — ENCOUNTER SYMPTOMS
CHEST TIGHTNESS: 0
EYE REDNESS: 0
CHOKING: 0
FACIAL SWELLING: 1
EYE DISCHARGE: 0
COUGH: 0
VOMITING: 0
TROUBLE SWALLOWING: 0
ABDOMINAL PAIN: 0
CONSTIPATION: 0
STRIDOR: 0
BACK PAIN: 0
SORE THROAT: 0
WHEEZING: 0
DIARRHEA: 0
EYE PAIN: 0
VOICE CHANGE: 0
SHORTNESS OF BREATH: 0
BLOOD IN STOOL: 0
SINUS PRESSURE: 0

## 2021-07-19 NOTE — ED PROVIDER NOTES
[facility] ED  eMERGENCY dEPARTMENT eNCOUnter      Pt Name: Jerri Contreras  MRN: [id number]  [place] 1950  Date of evaluation: 7/19/2021  Provider: Vianey Lyles MD    [address] [place] [address]       Chief Complaint   Patient presents with    Oral Swelling     swelling to upper lip          HISTORY OF PRESENT ILLNESS   (Location/Symptom, Timing/Onset,Context/Setting, Quality, Duration, Modifying Factors, Severity)  Note limiting factors. Jerri Contreras is a 79 y.o. male who presents to the emergency department patient woke up with the swelling of the tongue as well as of the upper lip denies eating seafood patient is not taking blood pressure medication but not take any ACE inhibitor or other taking angiotensin losartan patient no rash no swelling elsewhere no short of breath has no trouble eating or drinking or talking at this time  HPI    NursingNotes were reviewed. REVIEW OF SYSTEMS    (2-9 systems for level 4, 10 or more for level 5)     Review of Systems   Constitutional: Negative. Negative for activity change and fever. HENT: Positive for facial swelling. Negative for congestion, drooling, mouth sores, nosebleeds, sinus pressure, sore throat, trouble swallowing and voice change. Eyes: Negative for pain, discharge, redness and visual disturbance. Respiratory: Negative for cough, choking, chest tightness, shortness of breath, wheezing and stridor. Cardiovascular: Negative for chest pain, palpitations and leg swelling. Gastrointestinal: Negative for abdominal pain, blood in stool, constipation, diarrhea and vomiting. Endocrine: Negative for cold intolerance, polyphagia and polyuria. Genitourinary: Negative for dysuria, flank pain, frequency, genital sores and urgency. Musculoskeletal: Negative for back pain, joint swelling, neck pain and neck stiffness. Skin: Negative for pallor and rash. Neurological: Negative for tremors, seizures, syncope, weakness, numbness and headaches. Hematological: Negative for adenopathy. Does not bruise/bleed easily. Psychiatric/Behavioral: Negative for agitation, behavioral problems, hallucinations and sleep disturbance. The patient is not hyperactive. All other systems reviewed and are negative. Except as noted above the remainder of the review of systems was reviewed and negative. PAST MEDICAL HISTORY     Past Medical History:   Diagnosis Date    Hyperlipidemia     Hypertension          SURGICALHISTORY       Past Surgical History:   Procedure Laterality Date    COLONOSCOPY      FINGER AMPUTATION Left 1/2/2019    LEFT HAND: LEFT LONG FINGER AMPUTATION THROUGH DISTAL INTERPHALANGEAL JOINT DIGITAL BLOCK C-ARM MAC+LOCAL (PAT ON ADMIT)   performed by Marcos Hernandez DO at [address] Left     hip '08    [place] COLON CA SCRN NOT  [address] IND N/A 6/27/2017    COLONOSCOPY performed by Nigel Vale MD at [address]       Previous Medications    ASPIRIN 81 MG TABLET    Take 81 mg by mouth daily    HYDROCHLOROTHIAZIDE PO    Take by mouth    IBUPROFEN (ADVIL;MOTRIN) 600 MG TABLET    Take 1 tablet by mouth every 8 hours as needed for Pain    LOSARTAN (COZAAR) 50 MG TABLET    Take 50 mg by mouth daily    LOVASTATIN (MEVACOR) 40 MG TABLET    Take 40 mg by mouth nightly    MULTIPLE VITAMINS-MINERALS (ONE-A-DAY MENS 50+ ADVANTAGE PO)    Take by mouth    NAPROXEN SODIUM (ALEVE PO)    Take by mouth    NEOMYCIN-BACITRACIN-POLYMYXIN (NEOSPORIN) 5-400-5000 OINTMENT    Apply topically 4 times daily. PYRIDOXINE HCL (VITAMIN B-6) 100 MG TABLET    Take 100 mg by mouth daily       ALLERGIES     Patient has no known allergies. FAMILY HISTORY     No family history on file.        SOCIAL HISTORY       Social History     Socioeconomic History    Marital status:      Spouse name: Not on file    Number of children: Not on file    Years of education: Not on file    Highest education level: Not on file   Occupational History    Not on file   Tobacco Use    Smoking status: Former Smoker     Types: Pipe     Quit date: 2019     Years since quittin.4    Smokeless tobacco: Never Used   Substance and Sexual Activity    Alcohol use: No    Drug use: No    Sexual activity: Not on file   Other Topics Concern    Not on file   Social History Narrative    Not on file     Social Determinants of Health     Financial Resource Strain:     Difficulty of Paying Living Expenses:    Food Insecurity:     Worried About Running Out of Food in the Last Year:     [address] in the Last Year:    Transportation Needs:     Lack of Transportation (Medical):  Lack of Transportation (Non-Medical):    Physical Activity:     Days of Exercise per Week:     Minutes of Exercise per Session:    Stress:     Feeling of Stress :    Social Connections:     Frequency of Communication with Friends and Family:     Frequency of Social Gatherings with Friends and Family:     Attends Sikh Services:     Active Member of Clubs or Organizations:     Attends Club or Organization Meetings:     Marital Status:    Intimate Partner Violence:     Fear of Current or Ex-Partner:     Emotionally Abused:     Physically Abused:     Sexually Abused:        SCREENINGS    [place] Coma Scale  Eye Opening: Spontaneous  Best Verbal Response: Oriented  Best Motor Response: Obeys commands  [place] Coma Scale Score: 15 @FLOW([id number])@      PHYSICAL EXAM    (up to 7 for level 4, 8 or more for level 5)     ED Triage Vitals [21 1146]   BP Temp Temp Source Pulse Resp SpO2 Height Weight   (!) 154/97 99.4 °F (37.4 °C) Oral 112 20 98 % 5' 6\" (1.676 m) 155 lb (70.3 kg)       Physical Exam  Vitals and nursing note reviewed. Constitutional:       General: He is not in acute distress. Appearance: Normal appearance. He is well-developed. He is not ill-appearing, toxic-appearing or diaphoretic. Comments:  At alert cooperative patient nontoxic appearing   HENT:      Head: Normocephalic and atraumatic. Right Ear: Tympanic membrane, ear canal and external ear normal.      Left Ear: Tympanic membrane, ear canal and external ear normal.      Nose: Nose normal.      Mouth/Throat:      Mouth: Mucous membranes are moist.      Pharynx: No oropharyngeal exudate or posterior oropharyngeal erythema. Comments: Attention to the mouth and oral cavity patient has no rash has a little swelling to the upper lip also has swelling of the tongue no airway compromise no swelling of the neck no infection noted  Eyes:      General:         Right eye: No discharge. Left eye: No discharge. Extraocular Movements: Extraocular movements intact. Neck:      Vascular: No carotid bruit. Cardiovascular:      Rate and Rhythm: Normal rate. Heart sounds: Normal heart sounds. No murmur heard. No friction rub. No gallop. Pulmonary:      Effort: No respiratory distress. Breath sounds: Normal breath sounds. No stridor. No wheezing or rhonchi. Chest:      Chest wall: No tenderness. Abdominal:      General: Bowel sounds are normal.      Palpations: Abdomen is soft. There is no mass. Tenderness: There is no rebound. Musculoskeletal:         General: No tenderness. Normal range of motion. Cervical back: Neck supple. No rigidity or tenderness. Lymphadenopathy:      Cervical: No cervical adenopathy. Skin:     General: Skin is warm. Findings: No erythema or rash. Neurological:      Mental Status: He is alert and oriented to person, place, and time. Cranial Nerves: No cranial nerve deficit. Motor: No abnormal muscle tone. Psychiatric:         Behavior: Behavior normal.         Thought Content:  Thought content normal.         DIAGNOSTIC RESULTS     EKG: All EKG's are interpreted by the Emergency Department Physician who either signs or Co-signsthis chart in the absence of a cardiologist.        RADIOLOGY: Non-plain filmimages such as CT, Ultrasound and MRI are read by the radiologist. Plain radiographic images are visualized and preliminarily interpreted by the emergency physician with the below findings:        Interpretation per the Radiologist below, if available at the time ofthis note:    No orders to display         ED BEDSIDE ULTRASOUND:   Performed by ED Physician - none    LABS:  Labs Reviewed - No data to display    All other labs were within normal range or not returned as of this dictation. EMERGENCY DEPARTMENT COURSE and DIFFERENTIAL DIAGNOSIS/MDM:   Vitals:    Vitals:    07/19/21 1146   BP: (!) 154/97   Pulse: 112   Resp: 20   Temp: 99.4 °F (37.4 °C)   TempSrc: Oral   SpO2: 98%   Weight: 155 lb (70.3 kg)   Height: 5' 6\" (1.676 m)           MDM  Number of Diagnoses or Management Options  Diagnosis management comments: Patient kept in the emergency for more than 1 hour time in fact is getting better rather than worse no rash elsewhere swelling is better at this time able to talk in full sentences talking to his family over the phone      Samir Harry time was  minutes, excluding separately reportableprocedures. There was a high probability of clinicallysignificant/life threatening deterioration in the patient's condition which required my urgent intervention. NSULTS:  None    PROCEDURES:  Unless otherwise noted below, none     Procedures    FINAL IMPRESSION      1. Angioedema, initial encounter          DISPOSITION/PLAN   DISPOSITION        PATIENT REFERRED TO:  No follow-up provider specified.     DISCHARGE MEDICATIONS:  New Prescriptions    No medications on file          (Please note that portions of this note were completed with a voice recognition program.  Efforts were made to edit the dictations but occasionally words are mis-transcribed.)    Tano King MD (electronically signed)  Attending Emergency Physician     Tano King MD  07/19/21 1742

## 2021-07-19 NOTE — ED TRIAGE NOTES
Pt. Presents to ED with complaints of right side upper lip swelling. Pt. States when he woke up this morning his upper lip was swollen. Pt. Denies eating anything new.

## 2021-09-28 ENCOUNTER — HOSPITAL ENCOUNTER (OUTPATIENT)
Dept: LAB | Age: 71
Discharge: HOME | End: 2021-09-28
Payer: MEDICARE

## 2021-09-28 LAB
ALBUMIN SERPL-MCNC: 4.7 G/DL (ref 3.5–4.6)
ALP BLD-CCNC: 92 U/L (ref 35–104)
ALT SERPL-CCNC: 10 U/L (ref 0–41)
ANION GAP SERPL CALCULATED.3IONS-SCNC: 16 MEQ/L (ref 9–15)
AST SERPL-CCNC: 24 U/L (ref 0–40)
BILIRUB SERPL-MCNC: 1.4 MG/DL (ref 0.2–0.7)
BUN BLDV-MCNC: 17 MG/DL (ref 8–23)
CALCIUM SERPL-MCNC: 9.7 MG/DL (ref 8.5–9.9)
CHLORIDE BLD-SCNC: 105 MEQ/L (ref 95–107)
CHOLESTEROL, TOTAL: 153 MG/DL (ref 0–199)
CO2: 21 MEQ/L (ref 20–31)
CREAT SERPL-MCNC: 0.94 MG/DL (ref 0.7–1.2)
GFR AFRICAN AMERICAN: >60
GFR NON-AFRICAN AMERICAN: >60
GLOBULIN: 3 G/DL (ref 2.3–3.5)
GLUCOSE BLD-MCNC: 90 MG/DL (ref 70–99)
HDLC SERPL-MCNC: 59 MG/DL (ref 40–59)
LDL CHOLESTEROL CALCULATED: 81 MG/DL (ref 0–129)
POTASSIUM SERPL-SCNC: 4.4 MEQ/L (ref 3.4–4.9)
SODIUM BLD-SCNC: 142 MEQ/L (ref 135–144)
TOTAL PROTEIN: 7.7 G/DL (ref 6.3–8)
TRIGL SERPL-MCNC: 63 MG/DL (ref 0–150)

## 2021-09-28 PROCEDURE — 80053 COMPREHEN METABOLIC PANEL: CPT

## 2021-09-28 PROCEDURE — 80061 LIPID PANEL: CPT

## 2021-09-28 PROCEDURE — 36415 COLL VENOUS BLD VENIPUNCTURE: CPT

## 2021-10-07 ENCOUNTER — TELEPHONE (OUTPATIENT)
Dept: NEUROLOGY | Age: 71
End: 2021-10-07

## 2021-10-07 ENCOUNTER — OFFICE VISIT (OUTPATIENT)
Dept: NEUROLOGY | Age: 71
End: 2021-10-07
Payer: MEDICARE

## 2021-10-07 VITALS
BODY MASS INDEX: 24.69 KG/M2 | HEART RATE: 109 BPM | WEIGHT: 153 LBS | DIASTOLIC BLOOD PRESSURE: 76 MMHG | SYSTOLIC BLOOD PRESSURE: 130 MMHG

## 2021-10-07 DIAGNOSIS — R26.0 ATAXIC GAIT: Primary | ICD-10-CM

## 2021-10-07 DIAGNOSIS — M25.50 ARTHRALGIA, UNSPECIFIED JOINT: ICD-10-CM

## 2021-10-07 DIAGNOSIS — Z76.89 ENCOUNTER TO ESTABLISH CARE: ICD-10-CM

## 2021-10-07 DIAGNOSIS — M54.50 LUMBAR BACK PAIN: ICD-10-CM

## 2021-10-07 PROCEDURE — 3017F COLORECTAL CA SCREEN DOC REV: CPT | Performed by: NURSE PRACTITIONER

## 2021-10-07 PROCEDURE — G8420 CALC BMI NORM PARAMETERS: HCPCS | Performed by: NURSE PRACTITIONER

## 2021-10-07 PROCEDURE — 1036F TOBACCO NON-USER: CPT | Performed by: NURSE PRACTITIONER

## 2021-10-07 PROCEDURE — 99204 OFFICE O/P NEW MOD 45 MIN: CPT | Performed by: NURSE PRACTITIONER

## 2021-10-07 PROCEDURE — G8427 DOCREV CUR MEDS BY ELIG CLIN: HCPCS | Performed by: NURSE PRACTITIONER

## 2021-10-07 PROCEDURE — G8484 FLU IMMUNIZE NO ADMIN: HCPCS | Performed by: NURSE PRACTITIONER

## 2021-10-07 PROCEDURE — 1123F ACP DISCUSS/DSCN MKR DOCD: CPT | Performed by: NURSE PRACTITIONER

## 2021-10-07 PROCEDURE — 4040F PNEUMOC VAC/ADMIN/RCVD: CPT | Performed by: NURSE PRACTITIONER

## 2021-10-07 RX ORDER — LISINOPRIL 10 MG/1
TABLET ORAL
COMMUNITY
Start: 2021-09-14

## 2021-10-07 RX ORDER — AMLODIPINE BESYLATE 5 MG/1
TABLET ORAL
COMMUNITY
Start: 2021-07-26

## 2021-10-07 ASSESSMENT — ENCOUNTER SYMPTOMS
NAUSEA: 0
TROUBLE SWALLOWING: 0
ABDOMINAL DISTENTION: 0
COLOR CHANGE: 0
COUGH: 0
SHORTNESS OF BREATH: 0
ABDOMINAL PAIN: 0
CHEST TIGHTNESS: 0
WHEEZING: 0
VOMITING: 0
BACK PAIN: 1

## 2021-10-07 NOTE — PROGRESS NOTES
Subjective:      Patient ID: Tamiko Morales is a 79 y.o. male who presents today for:  Chief Complaint   Patient presents with    New Patient     Pt states that he does not know why he is here. He says he thought he has arthritis but the docotr told him no that isnt what is is. He says when he sits for a long time he gets stiff and it is hard to get up. He says he only has trouble walking when he sits for a long time as well. He says that all he can think of right now. HPI  Pt seen and examined in the office to establish care for gait ataxia. Patient is a 75-year-old -American male with past medical history of hypertension and hyperlipidemia and left hip replacement 2008. When asked why he was here for the appointment patient reports \"I do not know\". Upon further questioning he states that his primary care provider referred him here due to his difficulties with ambulation. He reports these have been ongoing for a couple years. He reports in the last 2 weeks has had to begin using a cane. Patient reports significant stiffness when he sits for long periods of time. He reports he will initially get stiff when he stands up but then as he moves around he gets better. He does report some lumbar back pain. During our visit when he would stand up he would hold his back. Denies any history of back injury or surgery. There is no neck pain or history of neck injury or surgery. Patient did fall a couple of times most recently last week. He reports it was because his knee gave out on him. He denies any falling backwards, lightheadedness, syncope, preceding chest pain pressure palpitations. Patient states he has not had any work-up with CT or MRI for his gait ataxia. He denies any associated bowel or bladder dysfunction. No memory changes. Patient does not have tremors or cogwheeling. No significant rigidity. No intention tremor, dysmetria, nystagmus. No reports of disequilibrium.   Romberg negative. Lower extremity reflexes 1-2+ bilateral.  There is no neuropathy or sensory changes. No vascular changes. No weakness, specifically no a sending weakness. No radicular pain. Patient does have very slow wide hobbling gait. Reports significant arthralgias in the knees and hips. Past Medical History:   Diagnosis Date    Hyperlipidemia     Hypertension      Past Surgical History:   Procedure Laterality Date    COLONOSCOPY      FINGER AMPUTATION Left 2019    LEFT HAND: LEFT LONG FINGER AMPUTATION THROUGH DISTAL INTERPHALANGEAL JOINT DIGITAL BLOCK C-ARM MAC+LOCAL (PAT ON ADMIT)   performed by Bridget Osorio DO at [address] Left     hip '08    [place] COLON CA SCRN NOT  [address] IND N/A 2017    COLONOSCOPY performed by Josselyn Alcantar MD at [address] [place] Marital status:      Spouse name: Not on file    Number of children: Not on file    Years of education: Not on file    Highest education level: Not on file   Occupational History    Not on file   Tobacco Use    Smoking status: Former Smoker     Types: Pipe     Quit date: 2019     Years since quittin.6    Smokeless tobacco: Never Used   Substance and Sexual Activity    Alcohol use: No    Drug use: No    Sexual activity: Not on file   Other Topics Concern    Not on file   Social History Narrative    Not on file     Social Determinants of Health     Financial Resource Strain:     Difficulty of Paying Living Expenses:    Food Insecurity:     Worried About Running Out of Food in the Last Year:     [address] in the Last Year:    Transportation Needs:     Lack of Transportation (Medical):      Lack of Transportation (Non-Medical):    Physical Activity:     Days of Exercise per Week:     Minutes of Exercise per Session:    Stress:     Feeling of Stress :    Social Connections:     Frequency of Communication with Friends and Family:     Frequency of Social Gatherings with Friends and Family:     Attends [employer] Services:     Active Member of Clubs or Organizations:     Attends Club or Organization Meetings:     Marital Status:    Intimate Partner Violence:     Fear of Current or Ex-Partner:     Emotionally Abused:     Physically Abused:     Sexually Abused:      No family history on file. No Known Allergies  Current Outpatient Medications on File Prior to Visit   Medication Sig Dispense Refill    amLODIPine (NORVASC) 5 MG tablet TAKE 1 TABLET BY MOUTH EVERY DAY      lisinopril (PRINIVIL;ZESTRIL) 10 MG tablet TAKE 1 TABLET BY MOUTH EVERY DAY      ibuprofen (ADVIL;MOTRIN) 600 MG tablet Take 1 tablet by mouth every 8 hours as needed for Pain 30 tablet 0    neomycin-bacitracin-polymyxin (NEOSPORIN) 5-400-5000 ointment Apply topically 4 times daily. 28 g 1    losartan (COZAAR) 50 MG tablet Take 50 mg by mouth daily      lovastatin (MEVACOR) 40 MG tablet Take 40 mg by mouth nightly      Multiple Vitamins-Minerals (ONE-A-DAY MENS 50+ ADVANTAGE PO) Take by mouth      Pyridoxine HCl (VITAMIN B-6) 100 MG tablet Take 100 mg by mouth daily       No current facility-administered medications on file prior to visit. Review of Systems   Constitutional: Positive for activity change. Negative for appetite change, chills, fatigue, fever and unexpected weight change. HENT: Negative for hearing loss and trouble swallowing. Eyes: Negative for visual disturbance. Respiratory: Negative for cough, chest tightness, shortness of breath and wheezing. Cardiovascular: Negative for chest pain, palpitations and leg swelling. Gastrointestinal: Negative for abdominal distention, abdominal pain, nausea and vomiting. Genitourinary: Negative for difficulty urinating. Musculoskeletal: Positive for arthralgias, back pain, gait problem and joint swelling. Negative for myalgias, neck pain and neck stiffness.    Skin: Negative for color change and 12/02/2018    RBC 4.66 12/02/2018    RBC 4.66 02/14/2012    HGB 14.0 12/02/2018    HCT 42.9 12/02/2018    MCV 92.2 12/02/2018    MCH 30.1 12/02/2018    MCHC 32.6 12/02/2018    RDW 14.8 12/02/2018     12/02/2018    MPV 8.0 02/14/2012     Lab Results   Component Value Date     09/28/2021    K 4.4 09/28/2021     09/28/2021    CO2 21 09/28/2021    BUN 17 09/28/2021    CREATININE 0.94 09/28/2021    GFRAA >60.0 09/28/2021    LABGLOM >60.0 09/28/2021    GLUCOSE 90 09/28/2021    GLUCOSE 93 02/14/2012    PROT 7.7 09/28/2021    LABALBU 4.7 09/28/2021    LABALBU 4.5 02/14/2012    CALCIUM 9.7 09/28/2021    BILITOT 1.4 09/28/2021    ALKPHOS 92 09/28/2021    AST 24 09/28/2021    ALT 10 09/28/2021     No results found for: PROTIME, INR  No results found for: TSH, [id number], FOLATE, FERRITIN, IRON, TIBC, PTRFSAT, TSH, FREET4  Lab Results   Component Value Date    TRIG 63 09/28/2021    HDL 59 09/28/2021    LDLCALC 81 09/28/2021     No results found for: Hector Chapman, LABBENZ, CANNAB, COCAINESCRN, LABMETH, OPIATESCREENURINE, PHENCYCLIDINESCREENURINE, PPXUR, ETOH  No results found for: LITHIUM, DILFRTOT, VALPROATE    Assessment and Plan:      1. Ataxic gait  - MRI LUMBAR SPINE WO CONTRAST; Future    2. Lumbar back pain  - MRI LUMBAR SPINE WO CONTRAST; Future    3. Arthralgia, unspecified joint  - Sedimentation Rate; Future  - High Sensitivity Crp; Future  - Rheumatoid Factor; Future    4. Encounter to establish care  -Patient is a 72-year-old -American male with past medical history of hypertension hyperlipidemia who is here to establish care for gait ataxia that has been ongoing over couple years and progressively worsening. He has had to begin using a cane over the last 2 weeks. There are no Parkinsonian symptoms. No concern for NPH. Patient does have significant stiffness of his joints especially the knees and the hips. Stiffness improves as he begins to walk.   He has low back pain when standing. No obvious weakness of the lower extremities. No neuropathies. Reflexes 1+ Achilles bilateral and 2+ patellar bilateral.  Concerned that patients significant arthralgias may be contributing to his gait ataxia. Will obtain sed rate, CRP and rheumatoid factor. Patient has been taking NSAIDs with minimal relief. Will consider steroids. Will obtain MRI of the lumbar spine given patient's gait issues as well as low back pain. Patient may benefit from referral to orthopedics if work-up negative. Return in about 3 months (around 1/7/2022), or if symptoms worsen or fail to improve.     BLANKA Randall - CNP     Collaborating Physician Dr Manuel Moon

## 2021-10-13 ENCOUNTER — TELEPHONE (OUTPATIENT)
Dept: NEUROLOGY | Age: 71
End: 2021-10-13

## 2021-10-13 NOTE — TELEPHONE ENCOUNTER
Called patient and gave him the number to scheduling. He states that he will give them a call in the morning to get it scheduled.

## 2021-11-12 ENCOUNTER — HOSPITAL ENCOUNTER (OUTPATIENT)
Dept: MRI IMAGING | Age: 71
Discharge: HOME | End: 2021-11-14
Payer: MEDICARE

## 2021-11-12 DIAGNOSIS — R26.0 ATAXIC GAIT: ICD-10-CM

## 2021-11-12 DIAGNOSIS — M54.50 LUMBAR BACK PAIN: ICD-10-CM

## 2021-11-12 PROCEDURE — 72148 MRI LUMBAR SPINE W/O DYE: CPT

## 2021-11-15 ENCOUNTER — TELEPHONE (OUTPATIENT)
Dept: NEUROLOGY | Age: 71
End: 2021-11-15

## 2021-11-15 DIAGNOSIS — M48.02 CERVICAL SPINAL STENOSIS: ICD-10-CM

## 2021-11-15 DIAGNOSIS — R26.0 ATAXIC GAIT: Primary | ICD-10-CM

## 2021-12-15 ENCOUNTER — HOSPITAL ENCOUNTER (OUTPATIENT)
Dept: MRI IMAGING | Age: 71
Discharge: HOME | End: 2021-12-17
Payer: MEDICARE

## 2021-12-15 DIAGNOSIS — M48.02 CERVICAL SPINAL STENOSIS: ICD-10-CM

## 2021-12-15 DIAGNOSIS — R26.0 ATAXIC GAIT: ICD-10-CM

## 2021-12-15 PROCEDURE — 72141 MRI NECK SPINE W/O DYE: CPT

## 2021-12-20 ENCOUNTER — TELEPHONE (OUTPATIENT)
Dept: NEUROLOGY | Age: 71
End: 2021-12-20

## 2021-12-20 DIAGNOSIS — M48.02 CERVICAL STENOSIS OF SPINAL CANAL: Primary | ICD-10-CM

## 2021-12-20 NOTE — TELEPHONE ENCOUNTER
MRI of cervical spine shows canal stenosis. Films reviewed with Dr. Nancy Wiseman. Will refer to neurosurgery.

## 2022-01-11 PROBLEM — I10 BENIGN HYPERTENSION: Status: ACTIVE | Noted: 2022-01-11

## 2022-01-11 PROBLEM — M25.50 ARTHRALGIA: Status: ACTIVE | Noted: 2022-01-11

## 2022-01-11 PROBLEM — R26.0 ATAXIC GAIT: Status: ACTIVE | Noted: 2022-01-11

## 2022-01-11 PROBLEM — E78.5 HYPERLIPIDEMIA: Status: ACTIVE | Noted: 2022-01-11

## 2022-01-11 PROBLEM — M54.50 LUMBAR BACK PAIN: Status: ACTIVE | Noted: 2022-01-11

## 2022-01-11 PROBLEM — Z96.642 HISTORY OF TOTAL LEFT HIP REPLACEMENT: Status: ACTIVE | Noted: 2022-01-11

## 2022-01-11 PROBLEM — G56.00 CARPAL TUNNEL SYNDROME: Status: ACTIVE | Noted: 2022-01-11

## 2022-01-12 ENCOUNTER — OFFICE VISIT (OUTPATIENT)
Dept: NEUROLOGY | Age: 72
End: 2022-01-12
Payer: MEDICARE

## 2022-01-12 VITALS
SYSTOLIC BLOOD PRESSURE: 136 MMHG | HEART RATE: 116 BPM | DIASTOLIC BLOOD PRESSURE: 82 MMHG | WEIGHT: 175 LBS | BODY MASS INDEX: 28.25 KG/M2

## 2022-01-12 DIAGNOSIS — G95.9 CERVICAL MYELOPATHY (HCC): Primary | ICD-10-CM

## 2022-01-12 PROCEDURE — 99214 OFFICE O/P EST MOD 30 MIN: CPT

## 2022-01-12 RX ORDER — ROSUVASTATIN CALCIUM 20 MG/1
TABLET, COATED ORAL
COMMUNITY
Start: 2021-10-24

## 2022-01-12 RX ORDER — BACLOFEN 10 MG/1
10 TABLET ORAL 2 TIMES DAILY PRN
Qty: 30 TABLET | Refills: 2 | Status: SHIPPED | OUTPATIENT
Start: 2022-01-12 | End: 2022-02-25

## 2022-01-12 ASSESSMENT — ENCOUNTER SYMPTOMS
SHORTNESS OF BREATH: 0
VOMITING: 0
CHEST TIGHTNESS: 0
NAUSEA: 0
DIARRHEA: 0
TROUBLE SWALLOWING: 0
COLOR CHANGE: 0
PHOTOPHOBIA: 0

## 2022-01-12 NOTE — PROGRESS NOTES
1 [facility]  [address] [facility] [address]  [phone]     Date of Visit:  2022  Patient Name: Mohit Moreau   Patient :  1950     CHIEF COMPLAINT:     oMhit Moreau is a 70 y.o. male who presents today for an general visit to be evaluated for the following condition(s):  Chief Complaint   Patient presents with    Follow-up     Pt states that things have been going fine since here last. He says that someone called him about possible surgery on his spine but he says he does not want to do that. HISTORY OF PRESENT ILLNESS     HPI  22  Since last visit, sed rate, CRP, and rheumatoid factor were obtained and were all within normal limits. Patient also had MRI of the lumbar spine on 21 which showed some degenerative changes without significant canal stenosis. Patient also had MRI of the cervical spine which did show mild to moderate canal stenosis at C3-4. 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 Patient does not use an assisted device. Patient's goal is to pursue more conservative therapies if they can improve his walking, but does not wish to explore surgical options unless life threatening. Patient was referred to Dr. Leanne Recio for evaluation, but did not follow up for the above reasons. Patient denies bowel or bladder incontinence, urinary retention, saddle anesthesia, neck pain, paresthesias, or falls. 10/7/21 Maria De Jesus Nguyen CNP)  Pt seen and examined in the office to establish care for gait ataxia. Patient is a 70-year-old -American male with past medical history of hypertension and hyperlipidemia and left hip replacement . When asked why he was here for the appointment patient reports \"I do not know\".   Upon further questioning he states that his primary care provider referred him here due to his difficulties with ambulation. He reports these have been ongoing for a couple years. He reports in the last 2 weeks has had to begin using a cane. Patient reports significant stiffness when he sits for long periods of time. He reports he will initially get stiff when he stands up but then as he moves around he gets better. He does report some lumbar back pain. During our visit when he would stand up he would hold his back. Denies any history of back injury or surgery. There is no neck pain or history of neck injury or surgery. Patient did fall a couple of times most recently last week. He reports it was because his knee gave out on him. He denies any falling backwards, lightheadedness, syncope, preceding chest pain pressure palpitations. Patient states he has not had any work-up with CT or MRI for his gait ataxia. He denies any associated bowel or bladder dysfunction. No memory changes. Patient does not have tremors or cogwheeling. No significant rigidity. No intention tremor, dysmetria, nystagmus. No reports of disequilibrium. Romberg negative. Lower extremity reflexes 1-2+ bilateral.  There is no neuropathy or sensory changes. No vascular changes. No weakness, specifically no a sending weakness. No radicular pain. Patient does have very slow wide hobbling gait. Reports significant arthralgias in the knees and hips. REVIEW OF SYSTEM      Review of Systems   Constitutional: Negative for diaphoresis and fever. HENT: Negative for congestion and trouble swallowing. Eyes: Negative for photophobia and visual disturbance. Respiratory: Negative for chest tightness and shortness of breath. Cardiovascular: Negative for chest pain. Gastrointestinal: Negative for diarrhea, nausea and vomiting. Genitourinary: Negative. Musculoskeletal: Positive for gait problem. Skin: Negative for color change.    Neurological: Negative for dizziness, tremors, seizures, syncope, facial asymmetry, speech difficulty, weakness, light-headedness, numbness and headaches. Psychiatric/Behavioral: Negative for confusion, hallucinations and self-injury. REVIEWED INFORMATION      No Known Allergies    Patient Active Problem List   Diagnosis    Hip joint replacement by other means    Primary osteoarthritis    Benign hypertension    Carpal tunnel syndrome    History of total left hip replacement    Hyperlipidemia    Ataxic gait    Lumbar back pain    Arthralgia       Past Medical History:   Diagnosis Date    Hyperlipidemia     Hypertension        Past Surgical History:   Procedure Laterality Date    COLONOSCOPY      FINGER AMPUTATION Left 2019    LEFT HAND: LEFT LONG FINGER AMPUTATION THROUGH DISTAL INTERPHALANGEAL JOINT DIGITAL BLOCK C-ARM MAC+LOCAL (PAT ON ADMIT)   performed by Russel Lind DO at [address] Left     hip '08    [place] COLON CA SCRN NOT  [address] IND N/A 2017    COLONOSCOPY performed by Sonia Thakur MD at [address] Marital status:      Spouse name: None    Number of children: None    Years of education: None    Highest education level: None   Occupational History    None   Tobacco Use    Smoking status: Former Smoker     Types: Pipe     Quit date: 2019     Years since quittin.9    Smokeless tobacco: Never Used   Substance and Sexual Activity    Alcohol use: No    Drug use: No    Sexual activity: None   Other Topics Concern    None   Social History Narrative    None     Social Determinants of Health     Financial Resource Strain:     Difficulty of Paying Living Expenses: Not on file   Food Insecurity:     Worried About Running Out of Food in the Last Year: Not on file    Dimitris of Food in the Last Year: Not on file   Transportation Needs:     Lack of Transportation (Medical): Not on file    Lack of Transportation (Non-Medical):  Not on file   Physical Activity:     Days of Exercise per Week: Not on file    Minutes of Exercise per Session: Not on file   Stress:     Feeling of Stress : Not on file   Social Connections:     Frequency of Communication with Friends and Family: Not on file    Frequency of Social Gatherings with Friends and Family: Not on file    Attends Rastafarian Services: Not on file    Active Member of [facility] or Organizations: Not on file    Attends Club or Organization Meetings: Not on file    Marital Status: Not on file   Intimate Partner Violence:     Fear of Current or Ex-Partner: Not on file    Emotionally Abused: Not on file    Physically Abused: Not on file    Sexually Abused: Not on file   Housing Stability:     Unable to Pay for Housing in the Last Year: Not on file    Number of [place] in the Last Year: Not on file    Unstable Housing in the Last Year: Not on file        PHYSICAL EXAM     Vitals:    01/12/22 1102   BP: 136/82   Site: Left Upper Arm   Position: Sitting   Cuff Size: Medium Adult   Pulse: 116   Weight: 175 lb (79.4 kg)     Neurological Examination:    Cognitive and Language: Alert and answered questions appropriately. Language was fluent including repetition and naming. Followed simple and complex commands. Cranial Nerves: Visual fields were full tested binocularly to finger counting in all 4 quadrants with no visual extinction. Pupils were equal and both reactive to light. Extraocular movements were full with no diplopia or nystagmus. Facial sensation was normal to light touch in V1 to V3. Facial strength was symmetric. Normal hearing grossly bilaterally. Palatal raise was symmetric. Shoulder shrug was symmetric. Tongue protrusion was symmetric with no fasciculations. Motor: Pronator drift was absent.      Right Left     Shoulder Abduction:  5 5   Elbow Extension 5 5   Elbow Flexion 5 5   Wrist Extension 5 5   Wrist Flexion 5 5          Right Left   Hip Flexion 4 4   Knee Extension 5 5   Knee Flexion 5 5   Dorsiflexion 5 5   Plantar Flexion 5 5     Tremor: absent     Tone: Increased tone in BUE and BLE    Reflexes:    Right Left   Brachioradialis 3 3   Biceps 3 3   Triceps 3 3   Patella 3 3   Ankle 1 0            Sensory: normal to light touch x 4 limbs. Coordination: Normal finger to nose. Negative romberg. Patient has spastic gait with circumduction of left leg. Patient has magnetic gait without significant dorsiflexion or heal tapping        ASSESSMENT/PLAN   Pleasant 75-year-old male here for follow up for gait ataxia. Since last visit, patient had an MRI of his lumbar and cervical spine which was significant for mild to moderate C3-C4 stenosis. Patient does have some increased tone in his BLE as well as hyperreflexia, but does not have red flag findings. Patient educated about the signs/symptoms of cord compression including bladder retention and weakness which patient does not currently have. Patient was referred to Dr. Lizzette Vega for evaluation, but did not follow up as he would prefer not to have surgery. Patient is agreeable to physical therapy as long as insurance covers it, and I will order. Will also provide patient with low dose baclofen to be taken as needed for occasional muscle tension. Patient educated about possible sedating side effects of medication. MRI of the lumbar spine did show some degenerative changes without significant central canal stenosis, and patient does not complain of associated pain other than occasional muscle tension. Initial lumbar MRI reported as having cord compression at C3-4 as well as cholelithiasis, but this was reported in error as these areas are not visualized on lumbar MRI. Further, dedicated cervical spine did not show findings of cord compression, only mild to moderate stenosis. Will therefore disregard these findings as erroneous.      Patient's gait ataxia related a combination of discrepancy in leg length (congenitally longer right leg), scoliosis, arthritis, and increased tone due to cervical stenosis. This should improve with PT and is not significantly bothersome to patient.        COMMUNICATION:       Electronically signed by Palomo Ibarra PA-C, PA-C on 1/12/2022 at 11:59 AM

## 2022-01-21 ENCOUNTER — HOSPITAL ENCOUNTER (OUTPATIENT)
Dept: PHYSICAL THERAPY | Age: 72
Setting detail: THERAPIES SERIES
Discharge: HOME | End: 2022-01-21
Payer: COMMERCIAL

## 2022-01-21 PROCEDURE — 97110 THERAPEUTIC EXERCISES: CPT

## 2022-01-21 PROCEDURE — 97163 PT EVAL HIGH COMPLEX 45 MIN: CPT

## 2022-01-21 NOTE — PROGRESS NOTES
Physical Therapy  Initial Assessment  Date: 2022  Patient Name: Domenica Gonzalez  MRN: [id number]  : 1950     Treatment Diagnosis: Cervical Myelopathy with ataxic gait; Back stiffness and falls risk; unsteady gait    Subjective   General  Chart Reviewed: Yes  Patient assessed for rehabilitation services?: Yes  Additional Pertinent Hx: Pt report not having any neck pain but having NT in both hands; Pt reports having increased back stiffness and some trouble getting in/out of chair; Reports having increase trouble when right knee so uses a cane at times and increase trouble with his gait. 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 Family / Caregiver Present: No  Referring Practitioner: Dr Sanderson  Referral Date : 22  Diagnosis: Cervical myelopathy with ataxic gait; Back stiffness and falls risk  PT Visit Information  Onset Date: 21 (1 year)  Total # of Visits Approved: 12 (1-2x per week for 4-6 weeks=12)  Total # of Visits to Date: 1  Plan of Care/Certification Expiration Date: 22  No Show: 0  Canceled Appointment: 0  Subjective  Subjective: Pt reports no pain currently but having trouble getting in/out of a chair  Pain Screening  Patient Currently in Pain: No  Vital Signs  Patient Currently in Pain: No    Objective          AROM RLE (degrees)  RLE AROM: WFL  AROM LLE (degrees)  LLE AROM : WFL  AROM RUE (degrees)  RUE AROM : WFL  AROM LUE (degrees)  LUE AROM : WFL  Spine  Cervical: Cervical flexion:30    ext: 38    SB R: 15    SB L: 15     Rot R: 50      Rot L: 45  Lumbar: Lumbar flexion: 50% of ROM; Limited with rotation both L and Right  Special Tests: TUG test: 29.4, 31.63 and 28.96= avg 30 seconds; SLS R= 0 sec and L LE=2 seconds without UE support  Joint Mobility  Spine: Pt reports having left leg shorter than his right and that he wears a built up shoe on his left foot;  Pt has a lumbar curve to the right with rotation to the left    Strength RLE  R Hip Flexion: 4/5  R Hip Extension: 4-/5  R Hip ABduction: 4/5  R Hip ADduction: 4/5  R Knee Flexion: 4/5  R Knee Extension: 4/5  R Ankle Dorsiflexion: 4/5  R Ankle Plantar flexion: 4/5  Strength LLE  L Hip Flexion: 4/5  L Hip Extension: 4-/5  L Hip ABduction: 4/5  L Hip ADduction: 4/5  L Knee Flexion: 4/5  L Knee Extension: 4/5  L Ankle Dorsiflexion: 4/5  Strength RUE  R Shoulder Flexion: 3+/5;4-/5  R Shoulder Extension: 4-/5  R Shoulder ABduction: 4-/5  R Shoulder Internal Rotation: 4-/5  R Shoulder External Rotation: 4-/5  R Elbow Flexion: 4/5  R Elbow Extension: 4/5  Strength LUE  L Shoulder Flexion: 3+/5;4-/5  L Shoulder Extension: 4-/5  L Shoulder ABduction: 4-/5  L Shoulder Internal Rotation: 4-/5  L Shoulder External Rotation: 4-/5  L Elbow Flexion: 4/5  L Elbow Extension: 4/5  Strength Other  Other: Core strength - 4-/5 trunk flexion; 4/5 trunk extension Rot R ad L=4/5              Transfers  Sit to Stand: Supervision  Stand to sit: Supervision       Ambulation  Ambulation?: Yes  Ambulation 1  Surface: level tile;carpet  Device: No Device  Assistance: Supervision  Quality of Gait: Pt ambulated with labored neurogenic gait pattern with decreased step length B LE with his hips internally rotated and decreased foot clearance of his left foot. Gait Deviations: Slow Raissa;Decreased step length;Shuffles  Distance: 40'x 1; 30'x 1     Exercises  Exercise 1: Heel raises/toe raises x 10 BLE  Exercise 2: SLS x 3 each side (no UE support) - 2 seconds on L LE;0 sec on RLE                      Assessment   Conditions Requiring Skilled Therapeutic Intervention  Body structures, Functions, Activity limitations: Decreased functional mobility ; Decreased endurance;Decreased balance;Decreased strength  Assessment: Pt is a 69 yo male who is seen in PT for cervical myelopy with antagic gait. Pt reports not having any neck pain but having trouble getting in/out of chairs and that his back is more \"stiff\".  PT completed evalution and is concerned about pts unsteady labored gait pattern with pt was not using an AD. PT advised pt to at least use his st cane but feels pt would benefit from a rollator. Pt was agreeable to using his st cane. Pt reports having a fall down his steps and cutting his head \"about 6 months to a year ago\". PT instructed pt in standing ther ex but will need to copy next session. Plan to work with appropriate AD to improve pts balance and work on strengthening program to decrease pts falls risk. Treatment Diagnosis: Cervical Myelopathy with ataxic gait; Back stiffness and falls risk; unsteady gait  Prognosis: Good  Decision Making: Medium Complexity  REQUIRES PT FOLLOW UP: Yes         Plan   Plan  Times per week: 1-2  Plan weeks: 4-6  Current Treatment Recommendations: Strengthening,Neuromuscular Re-education,Home Exercise Program,Manual Therapy - Soft Tissue Mobilization,Balance Training,Endurance Training,Patient/Caregiver Education & Training,Functional Mobility Training,Manual Therapy - Joint Manipulation,Transfer Training,Gait Training,Modalities,Stair training,Pain Management  Plan Comment: Estim, US, KT tape, CP/HP               Goals  Short term goals  Time Frame for Short term goals: 1-2 weeks from date of evaluation  Short term goal 1: Pt reports able to complete his HEP 3-5x per week  Short term goal 2: Pt using his cane when coming to./from PT with safer gait pattern (improved heel strike and step length)  Long term goals  Time Frame for Long term goals : 4-6 weeks  Long term goal 1: Pt reports pain as 2/10 or less with increased actvitiy and ADLs  Long term goal 2:  Increase core and B LE strength to 4/5 to 4+/5 so that pt can ambulate further distances before becoming fatigued/unsteady  Long term goal 3: Pt able to ambulate with AD with improvement in pts posture, step length and heel strike for >250'x with Mod indep  Long term goal 4: Improve pts TUG (Timed up and go test) to <20 seconds or better with AD  Long term goal 5: Pt indep with advanced HEP for strength and balance to decrease pts falls risk  Patient Goals   Patient goals :  To get stronger and be able to move better       Therapy Time   Individual Concurrent Group Co-treatment   Time In  10:00am          Time Out  11:00am          Minutes  60 min                  ST Yanelis#[id number]

## 2022-01-25 ENCOUNTER — HOSPITAL ENCOUNTER (OUTPATIENT)
Dept: PHYSICAL THERAPY | Age: 72
Setting detail: THERAPIES SERIES
Discharge: HOME | End: 2022-01-25
Payer: COMMERCIAL

## 2022-01-25 NOTE — PROGRESS NOTES
Physical Therapy  Daily Treatment Note  Date: 2022  Patient Name: Naheed Dotson  MRN: [id number]     :   1950    Subjective:   General  Chart Reviewed: Yes  Additional Pertinent Hx: Pt report not having any neck pain but having NT in both hands; Pt reports having increased back stiffness and some trouble getting in/out of chair; Reports having increase trouble when right knee so uses a cane at times and increase trouble with his gait. 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 Family / Caregiver Present: No  Referring Practitioner: Dr Sanderson  PT Visit Information  Onset Date: 21 (1 year)  Total # of Visits Approved: 12 (1-2x per week for 4-6 weeks=12)  Total # of Visits to Date: 1  Plan of Care/Certification Expiration Date: 22  No Show: 0  Canceled Appointment: 1  Subjective  Subjective: Pt canceled therapy session- awaiting authorization from insurance.                       Therapy Time   Individual Concurrent Group Co-treatment   Time In           Time Out           [address] [place] [address], Delroy Harry

## 2022-01-27 ENCOUNTER — APPOINTMENT (OUTPATIENT)
Dept: PHYSICAL THERAPY | Age: 72
End: 2022-01-27
Payer: COMMERCIAL

## 2022-02-23 ENCOUNTER — HOSPITAL ENCOUNTER (OUTPATIENT)
Dept: PHYSICAL THERAPY | Age: 72
Setting detail: THERAPIES SERIES
Discharge: HOME | End: 2022-02-23
Payer: MEDICARE

## 2022-02-23 PROCEDURE — 97110 THERAPEUTIC EXERCISES: CPT

## 2022-02-23 PROCEDURE — 97116 GAIT TRAINING THERAPY: CPT

## 2022-02-23 NOTE — PROGRESS NOTES
Physical Therapy  Daily Treatment Note  Date: 2022  Patient Name: Karlo Ramachandran  MRN: [id number]     :   1950    Treatment Diagnosis: Cervical Myelopathy with ataxic gait; Back stiffness and falls risk; unsteady gait       Subjective:   General  Chart Reviewed: Yes  Additional Pertinent Hx: Pt report not having any neck pain but having NT in both hands; Pt reports having increased back stiffness and some trouble getting in/out of chair; Reports having increase trouble when right knee so uses a cane at times and increase trouble with his gait. 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 Family / Caregiver Present: No  Referring Practitioner: Dr Sanderson  PT Visit Information  Onset Date: 21 (1 year)  Total # of Visits Approved: 12 (1-2x per week for 4-6 weeks=12)  Total # of Visits to Date: 2  Plan of Care/Certification Expiration Date: 22  No Show: 0  Canceled Appointment: 1  Subjective  Subjective: I'm fine today. I never had no neck pain. R hand feels funny somtimes like it's on fire. General Comment  Comments: Pt. reports never having neck pain and would like to focus on balance.    Pain Screening  Patient Currently in Pain: Yes (R knee sore )  Vital Signs  Patient Currently in Pain: Yes (R knee sore )  Patient Observation  Observations: amb to therapy no AD very small step lenght flexed at spine hips and knees dec foot clearance B hips IR        Treatment Activities:                  Ambulation  Ambulation?: Yes  Ambulation 1  Surface: level tile;carpet  Device: Single point cane  Assistance: Stand by assistance  Quality of Gait: Pt. ambulated with flat foot and dec step length B B knees flexed and stiff FF at hips and spine   Distance: 50' x 2   Comments: VC for inc heel strike unable                    Exercises  Exercise 1: Heel raises/toe raises x 10 BLE  Exercise 3: scap retractions facing mirror focus on posutre x 10 hold 3 sec   Exercise 4: standing hip abd 2 x 5 B   Exercise 5: sit<>stand x 10 with B arm rests. VC for tall posture and control descent   Exercise 6: LAQ x 10 hold 5 sec   Exercise 7: seated hip ABD x 10 hold 5 sec GTB   Exercise 20: standing therex at rail focusing on upright posture and gaze seated RB required                                    Assessment:   Conditions Requiring Skilled Therapeutic Intervention  Body structures, Functions, Activity limitations: Decreased functional mobility ; Decreased endurance;Decreased balance;Decreased strength  Assessment: Pt. denies neck pain and wishes to focus on his balance and strength. Pt. reports R knee sore sometimes but no neck pain. Pt. instructed in ambulation with cnae as uses sometimes at home. Some VC for sequencing but improved foot clearance and step lengh noted with SPC. Attempted discussing walker but pt. refrains from trying. Educated at this time saferto amb with SPC at all times vs. no AD for balance and imporved gait pattern. Issued HEP for home and focused on posture and strength at this time. Treatment Diagnosis: Cervical Myelopathy with ataxic gait; Back stiffness and falls risk; unsteady gait  Prognosis: Good  REQUIRES PT FOLLOW UP: Yes        Goals:  Short term goals  Time Frame for Short term goals: 1-2 weeks from date of evaluation  Short term goal 1: Pt reports able to complete his HEP 3-5x per week  Short term goal 2: Pt using his cane when coming to./from PT with safer gait pattern (improved heel strike and step length)  Long term goals  Time Frame for Long term goals : 4-6 weeks  Long term goal 1: Pt reports pain as 2/10 or less with increased actvitiy and ADLs  Long term goal 2:  Increase core and B LE strength to 4/5 to 4+/5 so that pt can ambulate further distances before becoming fatigued/unsteady  Long term goal 3: Pt able to ambulate with AD with improvement in pts posture, step length and heel strike for >250'x with Mod indep  Long term goal 4: Improve pts TUG (Timed up and go test) to <20 seconds or better with AD  Long term goal 5: Pt indep with advanced HEP for strength and balance to decrease pts falls risk  Patient Goals   Patient goals :  To get stronger and be able to move better    Plan:      Plan  Times per week: 1-2  Plan weeks: 4-6  Current Treatment Recommendations: Strengthening,Neuromuscular Re-education,Home Exercise Program,Manual Therapy - Soft Tissue Mobilization,Balance Training,Endurance Training,Patient/Caregiver Education & Training,Functional Mobility Training,Manual Therapy - Joint Manipulation,Transfer Training,Gait Training,Modalities,Stair training,Pain Management  Plan Comment: Estim, US, KT tape, CP/HP        Therapy Time   Individual Concurrent Group Co-treatment   Time In  100         Time Out  200         Minutes  [facility], [facility] [place]  License and [address] Number: [id number]

## 2022-02-25 ENCOUNTER — HOSPITAL ENCOUNTER (OUTPATIENT)
Dept: PHYSICAL THERAPY | Age: 72
Setting detail: THERAPIES SERIES
Discharge: HOME | End: 2022-02-25
Payer: MEDICARE

## 2022-02-25 PROCEDURE — 97530 THERAPEUTIC ACTIVITIES: CPT

## 2022-02-25 PROCEDURE — 97110 THERAPEUTIC EXERCISES: CPT

## 2022-02-25 PROCEDURE — 97112 NEUROMUSCULAR REEDUCATION: CPT

## 2022-02-25 RX ORDER — BACLOFEN 10 MG/1
10 TABLET ORAL 2 TIMES DAILY PRN
Qty: 60 TABLET | Refills: 2 | Status: SHIPPED | OUTPATIENT
Start: 2022-02-25

## 2022-02-25 NOTE — PROGRESS NOTES
Physical Therapy  Recheck Note  Date: 2022  Patient Name: Sandra Clay  MRN: [id number]     :   1950    Subjective:   General  Chart Reviewed: Yes  Additional Pertinent Hx: Pt report not having any neck pain but having NT in both hands; Pt reports having increased back stiffness and some trouble getting in/out of chair; Reports having increase trouble when right knee so uses a cane at times and increase trouble with his gait. 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 Family / Caregiver Present: No  Referring Practitioner: Dr Sanderson  PT Visit Information  Onset Date: 21 (1 year)  PT Insurance Information: Based on medical necessary   Total # of Visits Approved: 12 (3 current visits and 1-2x per week for 4-6 weeks=12)  Total # of Visits to Date: 3  Plan of Care/Certification Expiration Date: 22  No Show: 0  Canceled Appointment: 1  Subjective  Subjective: Pt reports no c/o pain currently. Pt had to scrap the ice off his car before he came to therapy today so that is why he is 20 minutes late for his appointment. General Comment  Comments: Recheck completed  22 with plans to continue with PT 1-2x per week for 4-6 weeks   Pain Screening  Patient Currently in Pain: Denies  Vital Signs  Patient Currently in Pain: Denies       Treatment Activities:                  Ambulation  Ambulation?: Yes  Ambulation 1  Surface: level tile;carpet  Device: Single point cane  Assistance: Stand by assistance  Quality of Gait: Pt ambulated with st cane with decreased step length B LE with a very narrow ESTEE with decreased heel strike B LE and more of a flat foot vs heel strike. Pt demonstrated fair dynamic balance. Gait Deviations: Slow Raissa;Decreased step length;Shuffles  Distance: 250'x 1   Comments: Without cane, pts hip flexion in standing increased and his L LE foot toed in and his step length decreased; demosntrated better form with st cane. Spine  Special Tests: TUG at recheck; 1. 28.76, 28.49, 31,58=29.61; SLS - not able to do on either side without UE support   Strength RLE  R Hip Flexion: 4/5;4+/5  R Hip Extension: 4/5  R Hip ABduction: 4/5  R Hip ADduction: 4/5  R Knee Flexion: 4/5;4+/5  R Knee Extension: 4/5;4+/5  R Ankle Dorsiflexion: 4/5  R Ankle Plantar flexion: 4/5  Strength LLE  L Hip Flexion: 4/5;4+/5  L Hip Extension: 4/5  L Hip ABduction: 4/5  L Hip ADduction: 4/5  L Knee Flexion: 4/5;4+/5  L Knee Extension: 4/5;4+/5  L Ankle Dorsiflexion: 4/5  Strength Other  Other: Core strength at recheck 4/5     Exercises  Exercise 1: Heel raises/toe raises x 15 BLE  Exercise 2: SLS x 3 each side (no UE support) - attempted but pt not able to do without UE support   Exercise 5: sit<>stand x 6 with B arm rests. VC for tall posture and control descent; took multiple attempts to get out of his chair  Exercise 8: Mini squats x 10   Exercise 9: Seated March x 20 BLE                                    Assessment:   Conditions Requiring Skilled Therapeutic Intervention  Body structures, Functions, Activity limitations: Decreased functional mobility ; Decreased endurance;Decreased balance;Decreased strength  Assessment: Pt arrives 20 min late to recheck due to icy weather. PT completed recheck on 2/25/22 with plans to continue with PT for 1-2x per week for 4-6 weeks. Pt has only been here for 3 visits and reports that he was waiting for further approval from his insurance before he came for more visits. Now that he has the approval, he plans to come on a more regular basis. Once recheck was completed, pt performed standing and seated ther ex with rest breaks due to fatigued. Pt was still challenged by the sit to stand ther ex when not using the arm rest. Pt came to therapy using his st cane and was advised to continue to use cane as his posture and gait was much improved when using the st cane.  Plan to continue to work on strength, balance and endurace to improve pts mobility and decrease his falls risk. Treatment Diagnosis: Cervical Myelopathy with ataxic gait; Back stiffness and falls risk; unsteady gait  Prognosis: Good  REQUIRES PT FOLLOW UP: Yes      G-Code:     OutComes Score                                                     Goals:  Short term goals  Time Frame for Short term goals: 1-2 weeks from date of evaluation  Short term goal 1: Pt reports able to complete his HEP 3-5x per week (Able to do 1x per week - not met )  Short term goal 2: Pt using his cane when coming to./from PT with safer gait pattern (improved heel strike and step length) (Progressing towards )  Long term goals  Time Frame for Long term goals : 4-6 weeks  Long term goal 1: Pt reports pain as 2/10 or less with increased actvitiy and ADLs (No c/o pain - goal met )  Long term goal 2: Increase core and B LE strength to 4/5 to 4+/5 so that pt can ambulate further distances before becoming fatigued/unsteady (Progressing towards goal )  Long term goal 3: Pt able to ambulate with AD with improvement in pts posture, step length and heel strike for >250'x with Mod indep (Progressing towards goal )  Long term goal 4: Improve pts TUG (Timed up and go test) to <20 seconds or better with AD (NOT met yet 29.61 average at recheck )  Long term goal 5: Pt indep with advanced HEP for strength and balance to decrease pts falls risk (Not met yet - will progress with goal )  Patient Goals   Patient goals :  To get stronger and be able to move better    Plan:       Frequency and duration of tx  Days: 2 (1-2)  Weeks: 4 (4-6)     Therapy Time   Individual Concurrent Group Co-treatment   Time In  11:20am         Time Out  12:20pm         Minutes  60 min                  Milton Perez PT  Therapy License Number: [id number]

## 2022-03-01 ENCOUNTER — HOSPITAL ENCOUNTER (OUTPATIENT)
Dept: PHYSICAL THERAPY | Age: 72
Setting detail: THERAPIES SERIES
Discharge: HOME | End: 2022-03-01
Payer: MEDICARE

## 2022-03-01 PROCEDURE — 97110 THERAPEUTIC EXERCISES: CPT

## 2022-03-01 ASSESSMENT — PAIN DESCRIPTION - LOCATION: LOCATION: KNEE

## 2022-03-01 ASSESSMENT — PAIN SCALES - GENERAL: PAINLEVEL_OUTOF10: 3

## 2022-03-01 ASSESSMENT — PAIN DESCRIPTION - ORIENTATION: ORIENTATION: RIGHT

## 2022-03-01 NOTE — PROGRESS NOTES
Physical Therapy  Daily Treatment Note  Date: 3/1/2022  Patient Name: Deni Euceda  MRN: [id number]     :   1950    Subjective:   General  Chart Reviewed: Yes  Additional Pertinent Hx: Pt report not having any neck pain but having NT in both hands; Pt reports having increased back stiffness and some trouble getting in/out of chair; Reports having increase trouble when right knee so uses a cane at times and increase trouble with his gait. 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 Family / Caregiver Present: No  Referring Practitioner: Dr Sanderson  PT Visit Information  Onset Date: 21  PT Insurance Information: Approval for 12 visits from 22 to 3/20/22  Total # of Visits Approved: 12  Total # of Visits to Date: 4  Plan of Care/Certification Expiration Date: 22  No Show: 0  Canceled Appointment: 1  Subjective  Subjective: Pt states no neck pain. \"I don't know why they even put that in there. It never bothers me\". Pt arrives using [facility] this date. Pt reports doing therapy every other day.    Pain Screening  Patient Currently in Pain: Yes  Pain Assessment  Pain Assessment: 0-10  Pain Level: 3  Pain Location: Knee  Pain Orientation: Right  Vital Signs  Patient Currently in Pain: Yes       Treatment Activities:                                      Exercises  Exercise 1: Heel raises/toe raises x 15 BLE H5''  Exercise 3: scap retractions facing mirror focus on posutre x 10 hold 3 sec   Exercise 7: seated hip ABD x 10 hold 5 sec BTB   Exercise 10: standing hip ABD; H3'' x 5 alternating   Exercise 11: standing hip extension; H3'' x 5 alternating   Exercise 12: standing march; H3-5'' x 10 alternating   Exercise 13: standing hamstring curl; H3'' x 10 alternating   Exercise 14: tandem stance; BUE H5'' RLE fwd, H5'' LLE fwd 0 UE assist                                    Assessment:   Conditions Requiring Skilled Therapeutic Intervention  Body structures, Functions, Activity limitations: Decreased functional mobility ; Decreased endurance;Decreased balance;Decreased strength  Assessment: Pt able to tolerate standing ther ex this date to increase postural strength and LE strength for balance and functional endurance. Pt not interested in trial of rollator. Advised pt on using cane at all times for safety. Pt reports R knee pain felt better after therapy with no pain. Educated pt on importance of performing HEP daily. Pt verbalizing understanding. Treatment Diagnosis: Cervical Myelopathy with ataxic gait; Back stiffness and falls risk; unsteady gait  REQUIRES PT FOLLOW UP: Yes      G-Code:     OutComes Score                                                     Goals:  Short term goals  Time Frame for Short term goals: 1-2 weeks from date of evaluation  Short term goal 1: Pt reports able to complete his HEP 3-5x per week  Short term goal 2: Pt using his cane when coming to./from PT with safer gait pattern (improved heel strike and step length)  Long term goals  Time Frame for Long term goals : 4-6 weeks  Long term goal 1: Pt reports pain as 2/10 or less with increased actvitiy and ADLs  Long term goal 2: Increase core and B LE strength to 4/5 to 4+/5 so that pt can ambulate further distances before becoming fatigued/unsteady  Long term goal 3: Pt able to ambulate with AD with improvement in pts posture, step length and heel strike for >250'x with Mod indep  Long term goal 4: Improve pts TUG (Timed up and go test) to <20 seconds or better with AD  Long term goal 5: Pt indep with advanced HEP for strength and balance to decrease pts falls risk  Patient Goals   Patient goals :  To get stronger and be able to move better    Plan:        Plan  Times per week: 1-2  Plan weeks: 4-6  Current Treatment Recommendations: Strengthening,Neuromuscular Re-education,Home Exercise Program,Manual Therapy - Soft Tissue Mobilization,Balance Training,Endurance Training,Patient/Caregiver Education & Training,Functional Mobility Training,Manual Therapy - Joint Manipulation,Transfer Training,Gait Training,Modalities,Stair training,Pain Management  Plan Comment: Estim, US, KT tape, CP/HP       Therapy Time   Individual Concurrent Group Co-treatment   Time In  1100         Time Out  1200         Minutes  1 [facility] Liane Ruelas PTA  License and [address] Number: [id number]

## 2022-03-03 ENCOUNTER — HOSPITAL ENCOUNTER (OUTPATIENT)
Dept: PHYSICAL THERAPY | Age: 72
Setting detail: THERAPIES SERIES
Discharge: HOME | End: 2022-03-03
Payer: MEDICARE

## 2022-03-03 PROCEDURE — 97116 GAIT TRAINING THERAPY: CPT

## 2022-03-03 PROCEDURE — 97530 THERAPEUTIC ACTIVITIES: CPT

## 2022-03-03 PROCEDURE — 97110 THERAPEUTIC EXERCISES: CPT

## 2022-03-03 ASSESSMENT — PAIN DESCRIPTION - ORIENTATION: ORIENTATION: RIGHT

## 2022-03-03 ASSESSMENT — PAIN DESCRIPTION - LOCATION: LOCATION: KNEE

## 2022-03-03 ASSESSMENT — PAIN SCALES - GENERAL: PAINLEVEL_OUTOF10: 2

## 2022-03-03 NOTE — PROGRESS NOTES
Pt. easily fatgiues. Distance: 250'x 1   Comments: VC for sequencing with holding cane in R hand. Exercises  Exercise 1: Heel raises/toe raises x 15 BLE H5''  Exercise 6: LAQ x 10 hold 5 sec   Exercise 7: seated hip ABD 2x 10 hold 5 sec BTB   Exercise 10: standing hip ABD; H3'' x 10 tactile and VC for QS \"I can feel the burn\"   Exercise 11: standing hip extension; H3'' x 10  Exercise 12: standing march; H3-5'' 2x 10 alternating   Exercise 15: sidestepping at rail no UE support SBA 10' x 2   Exercise 16: forward and retro at rail no UE support 10' x 2 laps focusing on inc step length and foot clearance. Assessment:   Conditions Requiring Skilled Therapeutic Intervention  Body structures, Functions, Activity limitations: Decreased functional mobility ; Decreased endurance;Decreased balance;Decreased strength  Assessment: Pt. with inc fatigue this date. Able to tolerate standing therex with seated RB. Pt. with mild knee pain. Focused on gait with inc foot clearance and step length. Also focused backing up and picking up feet vs pivoting when trurning with ambulation and transfers. Tolerates session with no inc in pain. Treatment Diagnosis: Cervical Myelopathy with ataxic gait; Back stiffness and falls risk; unsteady gait  Prognosis: Good  REQUIRES PT FOLLOW UP: Yes        Goals:  Short term goals  Time Frame for Short term goals: 1-2 weeks from date of evaluation  Short term goal 1: Pt reports able to complete his HEP 3-5x per week  Short term goal 2: Pt using his cane when coming to./from PT with safer gait pattern (improved heel strike and step length)  Long term goals  Time Frame for Long term goals : 4-6 weeks  Long term goal 1: Pt reports pain as 2/10 or less with increased actvitiy and ADLs  Long term goal 2:  Increase core and B LE strength to 4/5 to 4+/5 so that pt can ambulate further distances before becoming fatigued/unsteady  Long term goal 3: Pt able to ambulate with AD with improvement in pts posture, step length and heel strike for >250'x with Mod indep  Long term goal 4: Improve pts TUG (Timed up and go test) to <20 seconds or better with AD  Long term goal 5: Pt indep with advanced HEP for strength and balance to decrease pts falls risk  Patient Goals   Patient goals :  To get stronger and be able to move better    Plan:       Frequency and duration of tx  Days:  (1-2)  Weeks:  (4-6)     Therapy Time   Individual Concurrent Group Co-treatment   Time In  1000         Time Out  124 NGladys Hewitt PTA  License and [address] [address] Number: [id number]

## 2022-03-08 ENCOUNTER — HOSPITAL ENCOUNTER (OUTPATIENT)
Dept: PHYSICAL THERAPY | Age: 72
Setting detail: THERAPIES SERIES
Discharge: HOME | End: 2022-03-08
Payer: MEDICARE

## 2022-03-08 PROCEDURE — 97110 THERAPEUTIC EXERCISES: CPT

## 2022-03-08 ASSESSMENT — PAIN SCALES - GENERAL: PAINLEVEL_OUTOF10: 2

## 2022-03-08 ASSESSMENT — PAIN DESCRIPTION - LOCATION: LOCATION: KNEE

## 2022-03-08 ASSESSMENT — PAIN DESCRIPTION - ORIENTATION: ORIENTATION: RIGHT

## 2022-03-08 NOTE — PROGRESS NOTES
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 Family / Caregiver Present: No  Referring Practitioner: Dr Sanderson  PT Visit Information  Onset Date: 21  PT Insurance Information: Approval for 12 visits from 22 to 3/20/22  Total # of Visits Approved: 12  Total # of Visits to Date: 6  Plan of Care/Certification Expiration Date: 22  No Show: 0  Canceled Appointment: 1  Subjective  Subjective: Pt reports \"I always have tingling in my hands. Pain Screening  Patient Currently in Pain: Yes  Pain Assessment  Pain Assessment: 0-10  Pain Level: 2  Pain Location: Knee  Pain Orientation: Right  Vital Signs  Patient Currently in Pain: Yes       Treatment Activities:                                      Exercises  Exercise 1: Heel raises/toe raises x 10 BLE H5''  Exercise 6: LAQ x 10 hold 3 sec 2#  Exercise 8: Mini squats x 10 H3-5''  Exercise 10: standing hip ABD; H5'' x 10 tactile and VC for QS  Exercise 11: standing hip extension; H5'' x 10  Exercise 12: standing march; H3-5'' x 10   Exercise 13: standing hamstring curl; H3'' x 10 2#   Exercise 15: sidestepping at rail no UE support SBA 6' x 3   Exercise 16: forward and retro at rail no UE support 6' x 3 laps focusing on inc step length and foot clearance.     Exercise 17: seated hamstring stretch; H30'' x 3                                    Assessment:   Conditions Requiring Skilled Therapeutic Intervention  Body structures, Functions, Activity limitations: Decreased functional mobility ; Decreased endurance;Decreased balance;Decreased strength  Assessment: Pt needing VC's throughout for 5 second holds only as he tends to hold longer if distracted. Pt able to progress with strengthening tolerating ankle weight with no c/o pain, just fatigue. Pt most challenged with side stepping and fwd/retro gait without UE assist.   Treatment Diagnosis: Cervical Myelopathy with ataxic gait; Back stiffness and falls risk; unsteady gait  REQUIRES PT FOLLOW UP: Yes      G-Code:     OutComes Score                                                     Goals:  Short term goals  Time Frame for Short term goals: 1-2 weeks from date of evaluation  Short term goal 1: Pt reports able to complete his HEP 3-5x per week  Short term goal 2: Pt using his cane when coming to./from PT with safer gait pattern (improved heel strike and step length)  Long term goals  Time Frame for Long term goals : 4-6 weeks  Long term goal 1: Pt reports pain as 2/10 or less with increased actvitiy and ADLs  Long term goal 2: Increase core and B LE strength to 4/5 to 4+/5 so that pt can ambulate further distances before becoming fatigued/unsteady  Long term goal 3: Pt able to ambulate with AD with improvement in pts posture, step length and heel strike for >250'x with Mod indep  Long term goal 4: Improve pts TUG (Timed up and go test) to <20 seconds or better with AD  Long term goal 5: Pt indep with advanced HEP for strength and balance to decrease pts falls risk  Patient Goals   Patient goals :  To get stronger and be able to move better    Plan:       Frequency and duration of tx  Days:  (1-2)  Weeks:  (4-6)     Therapy Time   Individual Concurrent Group Co-treatment   Time In  1100         Time Out  1200         Minutes  1 [facility] [place], PTA  License and [address] [address] Number: [id number]

## 2022-03-10 ENCOUNTER — HOSPITAL ENCOUNTER (OUTPATIENT)
Dept: PHYSICAL THERAPY | Age: 72
Setting detail: THERAPIES SERIES
Discharge: HOME | End: 2022-03-10
Payer: MEDICARE

## 2022-03-10 PROCEDURE — 97110 THERAPEUTIC EXERCISES: CPT

## 2022-03-10 PROCEDURE — 97116 GAIT TRAINING THERAPY: CPT

## 2022-03-10 ASSESSMENT — PAIN DESCRIPTION - ORIENTATION: ORIENTATION: RIGHT

## 2022-03-10 ASSESSMENT — PAIN SCALES - GENERAL: PAINLEVEL_OUTOF10: 2

## 2022-03-10 ASSESSMENT — PAIN DESCRIPTION - LOCATION: LOCATION: KNEE

## 2022-03-10 NOTE — PROGRESS NOTES
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 Family / Caregiver Present: No  Referring Practitioner: Dr Sanderson  PT Visit Information  Onset Date: 21  PT Insurance Information: Approval for 12 visits from 22 to 3/20/22  Total # of Visits Approved: 12  Total # of Visits to Date: 7  Plan of Care/Certification Expiration Date: 22  No Show: 0  Canceled Appointment: 1  Subjective  Subjective: Pt states he felt wore out after last treatment. \"It wasn't as bad as the first couple of times\". Pain Screening  Patient Currently in Pain: Yes  Pain Assessment  Pain Assessment: 0-10  Pain Level: 2  Pain Location: Knee  Pain Orientation: Right  Vital Signs  Patient Currently in Pain: Yes       Treatment Activities:                  Ambulation  Ambulation?: Yes  Ambulation 1  Surface: level tile;carpet  Device: Single point cane  Assistance: Supervision  Quality of Gait: focusing on larger step length RLE  Distance: 250'  Comments: VC for sequencing with holding cane in R hand.                     Exercises  Exercise 6: LAQ x 10 hold 5 sec 2#  Exercise 10: standing hip ABD; H5'' x 10 1#  tactile and VC for QS  Exercise 11: standing hip extension; H5'' x 10 1#  Exercise 12: standing march; H5'' x 10 1#  Exercise 13: standing hamstring curl; H5'' x 15 1#   Exercise 15: sidestepping in hallway no UE support SBA 8' x 2   Exercise 17: seated hamstring stretch; H30'' x 3                                    Assessment:   Conditions Requiring Skilled Therapeutic Intervention  Body structures, Functions, Activity limitations: Decreased functional mobility ; Decreased endurance;Decreased balance;Decreased strength  Assessment: Pt continues to progress with strengthening tolerating 1# weight with no c/o pain, just fatigue. Educated pt on performing more standing ther ex for HEP to gain strength and endurance. Pt reports dec knee pain post tx. Educated pt on icing his knee if painful. Continue with POC. Treatment Diagnosis: Cervical Myelopathy with ataxic gait; Back stiffness and falls risk; unsteady gait  REQUIRES PT FOLLOW UP: Yes      G-Code:     OutComes Score                                                     Goals:  Short term goals  Time Frame for Short term goals: 1-2 weeks from date of evaluation  Short term goal 1: Pt reports able to complete his HEP 3-5x per week  Short term goal 2: Pt using his cane when coming to./from PT with safer gait pattern (improved heel strike and step length)  Long term goals  Time Frame for Long term goals : 4-6 weeks  Long term goal 1: Pt reports pain as 2/10 or less with increased actvitiy and ADLs  Long term goal 2: Increase core and B LE strength to 4/5 to 4+/5 so that pt can ambulate further distances before becoming fatigued/unsteady  Long term goal 3: Pt able to ambulate with AD with improvement in pts posture, step length and heel strike for >250'x with Mod indep  Long term goal 4: Improve pts TUG (Timed up and go test) to <20 seconds or better with AD  Long term goal 5: Pt indep with advanced HEP for strength and balance to decrease pts falls risk  Patient Goals   Patient goals :  To get stronger and be able to move better    Plan:       Frequency and duration of tx  Days:  (1-2)  Weeks:  (4-6)     Therapy Time   Individual Concurrent Group Co-treatment   Time In  1100         Time Out  1200         Minutes  1 AQUA PURE Liane Ruelas PTA  License and [address] Number: [id number]

## 2022-03-15 ENCOUNTER — HOSPITAL ENCOUNTER (OUTPATIENT)
Dept: PHYSICAL THERAPY | Age: 72
Setting detail: THERAPIES SERIES
Discharge: HOME | End: 2022-03-15
Payer: MEDICARE

## 2022-03-15 PROCEDURE — 97110 THERAPEUTIC EXERCISES: CPT

## 2022-03-15 NOTE — PROGRESS NOTES
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 Family / Caregiver Present: No  Referring Practitioner: Dr Sanderson  PT Visit Information  Onset Date: 21  PT Insurance Information: Approval for 12 visits from 22 to 3/20/22  Total # of Visits Approved: 12  Total # of Visits to Date: 8  Plan of Care/Certification Expiration Date: 22  No Show: 0  Canceled Appointment: 1  Subjective  Subjective: Pt reports no pain this date. He states he does his exercies \"when I'm in the mood\". Pt reports no recent falls. Pain Screening  Patient Currently in Pain: No  Vital Signs  Patient Currently in Pain: No       Treatment Activities:                                      Exercises  Exercise 1: Heel raises/toe raises x 10 BLE H5''  Exercise 6: LAQ x 10 hold 5 sec 3#  Exercise 8: Mini squats x 10 H3-5''  Exercise 9: standing hip flexion (SLR); x 10 H3'' 2#   Exercise 10: standing hip ABD; H3-5'' x 10 2#  tactile and VC for QS  Exercise 11: standing hip extension; H5'' x 10 2#  Exercise 12: standing march; H5'' x 10 2#  Exercise 13: standing hamstring curl; H5'' x 10 2#                                    Assessment:   Conditions Requiring Skilled Therapeutic Intervention  Body structures, Functions, Activity limitations: Decreased functional mobility ; Decreased endurance;Decreased balance;Decreased strength  Assessment: Pt educated on importance of compliance with HEP in order to gain and maintain strength.   Pt able to stand longer durations (45 mins) for ther ex with less c/o fatigue and tolerating inc weight. Pt reports no pain post. Continue with POC. Treatment Diagnosis: Cervical Myelopathy with ataxic gait; Back stiffness and falls risk; unsteady gait  REQUIRES PT FOLLOW UP: Yes      G-Code:     OutComes Score                                                     Goals:  Short term goals  Time Frame for Short term goals: 1-2 weeks from date of evaluation  Short term goal 1: Pt reports able to complete his HEP 3-5x per week  Short term goal 2: Pt using his cane when coming to./from PT with safer gait pattern (improved heel strike and step length)-GOAL MET  Long term goals  Time Frame for Long term goals : 4-6 weeks  Long term goal 1: Pt reports pain as 2/10 or less with increased actvitiy and ADLs  Long term goal 2: Increase core and B LE strength to 4/5 to 4+/5 so that pt can ambulate further distances before becoming fatigued/unsteady  Long term goal 3: Pt able to ambulate with AD with improvement in pts posture, step length and heel strike for >250'x with Mod indep  Long term goal 4: Improve pts TUG (Timed up and go test) to <20 seconds or better with AD  Long term goal 5: Pt indep with advanced HEP for strength and balance to decrease pts falls risk  Patient Goals   Patient goals :  To get stronger and be able to move better    Plan:       Frequency and duration of tx  Days:  (1-2)  Weeks:  (4-6)     Therapy Time   Individual Concurrent Group Co-treatment   Time In  1100         Time Out  1200         Minutes  1 [facility] LIN Ruelas  License and [address] Number: [id number]

## 2022-03-17 ENCOUNTER — HOSPITAL ENCOUNTER (OUTPATIENT)
Dept: PHYSICAL THERAPY | Age: 72
Setting detail: THERAPIES SERIES
Discharge: HOME | End: 2022-03-17
Payer: MEDICARE

## 2022-03-17 PROCEDURE — 97116 GAIT TRAINING THERAPY: CPT

## 2022-03-17 PROCEDURE — 97530 THERAPEUTIC ACTIVITIES: CPT

## 2022-03-17 NOTE — PROGRESS NOTES
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 Family / Caregiver Present: No  Referring Practitioner: Dr Sanderson  PT Visit Information  Onset Date: 21  PT Insurance Information: Approval for 12 visits from 22 to 3/20/22  Total # of Visits Approved: 12  Total # of Visits to Date: 9  Plan of Care/Certification Expiration Date: 22  No Show: 0  Canceled Appointment: 1  Subjective  Subjective: Pt. reports nopain this date. Feeling stronger since doing exercises. Pt. reports feeling tired today and did not sleep well last night. Pain Screening  Patient Currently in Pain: No  Vital Signs  Patient Currently in Pain: No       Treatment Activities:                  Ambulation  Ambulation?: Yes  Ambulation 1  Surface: level tile;carpet  Device: Single point cane  Assistance: Contact guard assistance;Stand by assistance  Quality of Gait: focusing on heel strike and inc step length can maintain approx 100' then toe walking and difficulty clearing B feet cues given but reports fatgiue. Standing RB post 120' and then 27' with CGA to chair for seated rest.  Pt. denies dizziness but reports being hot. Distance: 150' total with one standing RB post 120'   Comments: Vitals taken post ambulation SPO2 is 95%,  bpm and BP is 150/98 mmHg. Rechecked 5 min later and BP is 148/96 mmHg and .   All from seated position and in LUE. Pt. given water, declines juice. Pt. reports not eating prior to session and takes BP medication. Pt. reports taking his medication today. Exercises  Exercise 20: Therex deferred this date due to high BP                                    Assessment:   Conditions Requiring Skilled Therapeutic Intervention  Body structures, Functions, Activity limitations: Decreased functional mobility ; Decreased endurance;Decreased balance;Decreased strength  Assessment: Pt. limited with high BP and fatigue this date. Encouraged calling MD if remains high or to ER if needed. Pt. declined juice though reports not eating prior to therapy. Encouraged needs to eat prior to exercise. Pt. also encouraged WC transport to car. Pt. declined. Poor ability with step lenght and step height during gait trial.   Deferred further tx this date. Treatment Diagnosis: Cervical Myelopathy with ataxic gait; Back stiffness and falls risk; unsteady gait  REQUIRES PT FOLLOW UP: Yes        Goals:  Short term goals  Time Frame for Short term goals: 1-2 weeks from date of evaluation  Short term goal 1: Pt reports able to complete his HEP 3-5x per week  Short term goal 2: Pt using his cane when coming to./from PT with safer gait pattern (improved heel strike and step length)-GOAL MET  Long term goals  Time Frame for Long term goals : 4-6 weeks  Long term goal 1: Pt reports pain as 2/10 or less with increased actvitiy and ADLs  Long term goal 2: Increase core and B LE strength to 4/5 to 4+/5 so that pt can ambulate further distances before becoming fatigued/unsteady  Long term goal 3: Pt able to ambulate with AD with improvement in pts posture, step length and heel strike for >250'x with Mod indep  Long term goal 4: Improve pts TUG (Timed up and go test) to <20 seconds or better with AD  Long term goal 5: Pt indep with advanced HEP for strength and balance to decrease pts falls risk  Patient Goals   Patient goals :  To get stronger and be able to move better    Plan:       Frequency and duration of tx  Days:  (1-2)  Weeks:  (4-6)     Therapy Time   Individual Concurrent Group Co-treatment   Time In  100         Time Out  [address], PTA  License and [address] Number: [id number]

## 2022-03-22 ENCOUNTER — HOSPITAL ENCOUNTER (OUTPATIENT)
Dept: PHYSICAL THERAPY | Age: 72
Setting detail: THERAPIES SERIES
Discharge: HOME | End: 2022-03-22
Payer: MEDICARE

## 2022-03-22 PROCEDURE — 97110 THERAPEUTIC EXERCISES: CPT

## 2022-03-22 PROCEDURE — 97530 THERAPEUTIC ACTIVITIES: CPT

## 2022-03-22 NOTE — PROGRESS NOTES
Physical Therapy  Daily Treatment Note  Date: 3/22/2022  Patient Name: Sandra Clay  MRN: [id number]     :   1950    Subjective:   General  Chart Reviewed: Yes  Additional Pertinent Hx: Pt report not having any neck pain but having NT in both hands; Pt reports having increased back stiffness and some trouble getting in/out of chair; Reports having increase trouble when right knee so uses a cane at times and increase trouble with his gait. 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 Family / Caregiver Present: No  Referring Practitioner: Dr Sanderson  PT Visit Information  Onset Date: 21  PT Insurance Information: Approval for 12 visits from 22 to 3/20/22  Total # of Visits Approved: 12  Total # of Visits to Date: 10  Plan of Care/Certification Expiration Date: 22  No Show: 0  Canceled Appointment: 1  Subjective  Subjective: Pt reports slight R knee pain this date. Pt reports inc ease getting out of low chairs. General Comment  Comments: BP seated 159/96 mmHg , 2nd BP seated 147/89 mmHg  Pain Screening  Patient Currently in Pain: No  Vital Signs  Patient Currently in Pain: No       Treatment Activities:                                      Exercises  Exercise 6: LAQ x 10 hold 5 sec 3#  Exercise 8: Mini squats x 10 H3-5'' (VC's and demo for proper form )  Exercise 9: standing hip flexion (SLR); x 10 H3'' 3#  Exercise 10: standing hip ABD; H3-5'' x 10 3#  tactile and VC for QS  Exercise 11: standing hip extension; H5'' x 10 3#  Exercise 12: standing march; H5'' x 10 3#  Exercise 13: standing hamstring curl; H5'' x 10 3#   Exercise 19: Nautilus resisted walking fwd/retro x 3 20#                                    Assessment:   Conditions Requiring Skilled Therapeutic Intervention  Body structures, Functions, Activity limitations: Decreased functional mobility ; Decreased endurance;Decreased balance;Decreased strength  Assessment: Pt progressing with strengthening tolerating inc weight for standing ther ex. No inc in knee pain during tx. BP post tx 149/91 mmHg. Pt fatigued post tx but no c/o pain. Continue with POC. Treatment Diagnosis: Cervical Myelopathy with ataxic gait; Back stiffness and falls risk; unsteady gait  REQUIRES PT FOLLOW UP: Yes      G-Code:     OutComes Score                                                     Goals:  Short term goals  Time Frame for Short term goals: 1-2 weeks from date of evaluation  Short term goal 1: Pt reports able to complete his HEP 3-5x per week  Short term goal 2: Pt using his cane when coming to./from PT with safer gait pattern (improved heel strike and step length)-GOAL MET  Long term goals  Time Frame for Long term goals : 4-6 weeks  Long term goal 1: Pt reports pain as 2/10 or less with increased actvitiy and ADLs  Long term goal 2: Increase core and B LE strength to 4/5 to 4+/5 so that pt can ambulate further distances before becoming fatigued/unsteady  Long term goal 3: Pt able to ambulate with AD with improvement in pts posture, step length and heel strike for >250'x with Mod indep  Long term goal 4: Improve pts TUG (Timed up and go test) to <20 seconds or better with AD  Long term goal 5: Pt indep with advanced HEP for strength and balance to decrease pts falls risk  Patient Goals   Patient goals :  To get stronger and be able to move better    Plan:       Frequency and duration of tx  Days:  (1-2)  Weeks:  (4-6)     Therapy Time   Individual Concurrent Group Co-treatment   Time In  1100         Time Out  1200         Minutes  1 [facility] LIN uRelas  License and [address] Number: [id number]

## 2022-03-25 ENCOUNTER — HOSPITAL ENCOUNTER (OUTPATIENT)
Dept: PHYSICAL THERAPY | Age: 72
Setting detail: THERAPIES SERIES
Discharge: HOME | End: 2022-03-25
Payer: MEDICARE

## 2022-04-04 ENCOUNTER — HOSPITAL ENCOUNTER (OUTPATIENT)
Dept: PHYSICAL THERAPY | Age: 72
Setting detail: THERAPIES SERIES
Discharge: HOME | End: 2022-04-04
Payer: MEDICARE

## 2022-04-04 PROCEDURE — 97530 THERAPEUTIC ACTIVITIES: CPT

## 2022-04-04 PROCEDURE — 97112 NEUROMUSCULAR REEDUCATION: CPT

## 2022-04-04 NOTE — PROGRESS NOTES
Physical Therapy  Daily recheck and tx Note  Date: 2022  Patient Name: Jay Felix  MRN: [id number]     :   1950    Subjective:   General  Chart Reviewed: Yes  Additional Pertinent Hx: Pt report not having any neck pain but having NT in both hands; Pt reports having increased back stiffness and some trouble getting in/out of chair; Reports having increase trouble when right knee so uses a cane at times and increase trouble with his gait. 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 Family / Caregiver Present: No  Referring Practitioner: Dr Sanderson  PT Visit Information  Onset Date: 21  PT Insurance Information: Approval for 12 visits from 22 to 3/20/22; Approval for an additonal 12 visits on 3/22/22 to 22   Total # of Visits Approved: 20 (11 current plus additional visits approved= 20 visits )  Total # of Visits to Date: 6  Plan of Care/Certification Expiration Date: 22 (Approval from insurance good until 22 )  No Show: 0  Canceled Appointment: 1  Subjective  Subjective: Pt reports feeling pretty good today. General Comment  Comments: Recheck completed on  22; Pt has approval until 22   Pain Screening  Patient Currently in Pain: No  Vital Signs  Patient Currently in Pain: No       Treatment Activities:         Transfers  Sit to Stand: Supervision  Stand to sit: Supervision  Comment: Focused on control with sit to stand and having the chair closer to his legs before sitting        Ambulation  Ambulation?: Yes  Ambulation 1  Surface: level tile;carpet  Device: Single point cane  Assistance: Contact guard assistance;Stand by assistance  Quality of Gait: Pt ambulated forward gait with improvment noted of left foot placement with less foot drag noted.  Pt was able to perform retrol gait with very small step length BLE   Gait Deviations: Slow Raissa;Decreased step length;Shuffles  Distance: 150'x1 total   Comments: Pt was SOB post gait and reported having increased right knee pain at end of gait trial            Spine  Special Tests: TUG at recheck 4/4/22 Average was 37.35 (Tug was average 30 on eval)   Strength RLE  R Hip Flexion: 4/5;4+/5  R Hip Extension: 4/5;4+/5  R Hip ABduction: 4/5;4+/5  R Hip ADduction: 4/5;4+/5  R Knee Flexion: 4/5;4+/5  R Knee Extension: 4/5;4+/5  R Ankle Dorsiflexion: 4/5  R Ankle Plantar flexion: 4/5  Strength LLE  L Hip Flexion: 4/5;4+/5  L Hip Extension: 4/5;4+/5  L Hip ABduction: 4/5;4+/5  L Hip ADduction: 4/5;4+/5  L Knee Flexion: 4/5;4+/5  L Knee Extension: 4/5;4+/5  L Ankle Dorsiflexion: 4/5    Exercises  Exercise 5: sit<>stand x 7 - able to get 3/4 of way to chair before using his arm; able to demonstrate control with most reps and occasionally sat too hard in chair                                    Assessment:   Conditions Requiring Skilled Therapeutic Intervention  Body structures, Functions, Activity limitations: Decreased functional mobility ; Decreased endurance;Decreased balance;Decreased strength  Assessment: PT completed recheck with plans to continue with pt as pt is progressing with his mobility and strength therefore improving his safety with gait and transfers and decreasing his risk of fall. Once recheck completed, work on sit to stand transfers and fwd/retro gait with his st cane. Plan to continue to progress with pts strength and mobility.    Treatment Diagnosis: Cervical Myelopathy with ataxic gait; Back stiffness and falls risk; unsteady gait  Prognosis: Good  REQUIRES PT FOLLOW UP: Yes      G-Code:     OutComes Score                                                     Goals:  Short term goals  Time Frame for Short term goals: 1-2 weeks from date of evaluation  Short term goal 1: Pt reports able to complete his HEP 3-5x per week (2x per week )  Short term goal 2: Pt using his cane when coming to./from PT with safer gait pattern (improved heel strike and step length)-GOAL MET (Goal met )  Long term goals  Time Frame for Long term goals : 4-6 weeks  Long term goal 1: Pt reports pain as 2/10 or less with increased actvitiy and ADLs (Progressing with his goal )  Long term goal 2: Increase core and B LE strength to 4/5 to 4+/5 so that pt can ambulate further distances before becoming fatigued/unsteady (Progressing towards goal )  Long term goal 3: Pt able to ambulate with AD with improvement in pts posture, step length and heel strike for >250'x with Mod indep (Progressing towards goal and distance )  Long term goal 4: Improve pts TUG (Timed up and go test) to <25 seconds or better with AD (35.88, 38,02, 38,15=Average Tug 37.35 - not met )  Long term goal 5: Pt indep with advanced HEP for strength and balance to decrease pts falls risk (Progressing towards goal )  Patient Goals   Patient goals :  To get stronger and be able to move better    Plan:  Continue to progress with POC           Therapy Time   Individual Concurrent Group Co-treatment   Time In  10:30am          Time Out  11:15am          Minutes  45 min                  Nina Villanueva PT  Therapy License Number: [id number]

## 2022-04-06 ENCOUNTER — HOSPITAL ENCOUNTER (OUTPATIENT)
Dept: PHYSICAL THERAPY | Age: 72
Setting detail: THERAPIES SERIES
Discharge: HOME | End: 2022-04-06
Payer: MEDICARE

## 2022-04-06 PROCEDURE — 97116 GAIT TRAINING THERAPY: CPT

## 2022-04-06 PROCEDURE — 97110 THERAPEUTIC EXERCISES: CPT

## 2022-04-06 ASSESSMENT — PAIN DESCRIPTION - DESCRIPTORS: DESCRIPTORS: TIGHTNESS

## 2022-04-06 ASSESSMENT — PAIN DESCRIPTION - ORIENTATION: ORIENTATION: RIGHT

## 2022-04-06 ASSESSMENT — PAIN DESCRIPTION - LOCATION: LOCATION: BACK

## 2022-04-06 NOTE — PROGRESS NOTES
Physical Therapy  Daily Treatment Note  Date: 2022  Patient Name: Chelsea Bejarano  MRN: [id number]     :   1950    Subjective:   General  Chart Reviewed: Yes  Additional Pertinent Hx: Pt report not having any neck pain but having NT in both hands; Pt reports having increased back stiffness and some trouble getting in/out of chair; Reports having increase trouble when right knee so uses a cane at times and increase trouble with his gait. 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 Family / Caregiver Present: No  Referring Practitioner: Dr Sanderson  PT Visit Information  Onset Date: 21  PT Insurance Information: Approval for 12 visits from 22 to 3/20/22; Approval for an additonal 12 visits on 3/22/22 to 22   Total # of Visits Approved: 20 (11 current plus additional visits approved= 20 visits )  Total # of Visits to Date: 15  Plan of Care/Certification Expiration Date: 22 (Approval from insurance good until 22 )  No Show: 0  Canceled Appointment: 1  Subjective  Subjective: Pt. states he felt he slept wrong mild tightness in R side of back states was tossing and turning in his sleep. Pain Screening  Patient Currently in Pain: Yes  Pain Assessment  Pain Assessment: 0-10  Pain Location: Back  Pain Orientation: Right  Pain Descriptors: Tightness  Vital Signs  Patient Currently in Pain: Yes       Treatment Activities:                  Ambulation  Ambulation?: Yes  Ambulation 1  Surface: level tile;carpet  Device: Single point cane  Assistance: Stand by assistance  Quality of Gait: Pt. demos dec step length B with B knees in flexed position absent heel strike but able to clear feet. Distance: 150'x1 total   Comments: Mild SOB                    Exercises  Exercise 1: Supine QS x 10 hold 5 sec to promote knee ext.    Exercise 2: SLR x 10 hold 3 sec lower with control   Exercise 3: Bridge x 10 VC to keep knees    Exercise 4: Hooklying Hip ABD x 10 GTB hold 5 sec   Exercise 10: standing hip ABD; H3-5'' x 10 3#  tactile and VC for QS  Exercise 11: standing hip extension; H5'' x 10 3#  Exercise 12: standing march; H5'' x 10 3#  Exercise 15: sidestepping in hallway no UE support SBA 8' x 2   Exercise 16: forward and retro at rail no UE support 8' x 3 laps focusing on inc step length and foot clearance. Exercise 17: seated hamstring stretch; H30'' x 3                                    Assessment:   Conditions Requiring Skilled Therapeutic Intervention  Body structures, Functions, Activity limitations: Decreased functional mobility ; Decreased endurance;Decreased balance;Decreased strength  Assessment: Pt. demoing improved foot clearance with gait using SPC for short distnaces. However cont to have tendency to plop in chair with inc faituge during therex. VC to back all the way up to chair fully rech back then sit so less torque on spine and legs. Cont with balanc/gait activites at [address] rail focusing on step length and foot clearance. Treatment Diagnosis: Cervical Myelopathy with ataxic gait; Back stiffness and falls risk; unsteady gait  Prognosis: Good  REQUIRES PT FOLLOW UP: Yes        Goals:  Short term goals  Time Frame for Short term goals: 1-2 weeks from date of evaluation  Short term goal 1: Pt reports able to complete his HEP 3-5x per week (2x per week )  Short term goal 2: Pt using his cane when coming to./from PT with safer gait pattern (improved heel strike and step length)-GOAL MET (Goal met )  Long term goals  Time Frame for Long term goals : 4-6 weeks  Long term goal 1: Pt reports pain as 2/10 or less with increased actvitiy and ADLs (Progressing with his goal )  Long term goal 2:  Increase core and B LE strength to 4/5 to 4+/5 so that pt can ambulate further distances before becoming fatigued/unsteady (Progressing towards goal )  Long term goal 3: Pt able to ambulate with AD with improvement in pts posture, step length and heel strike for >250'x with Mod indep (Progressing towards goal and distance )  Long term goal 4: Improve pts TUG (Timed up and go test) to <25 seconds or better with AD (35.88, 38,02, 38,15=Average Tug 37.35 - not met )  Long term goal 5: Pt indep with advanced HEP for strength and balance to decrease pts falls risk (Progressing towards goal )  Patient Goals   Patient goals :  To get stronger and be able to move better    Plan:       Frequency and duration of tx  Days:  (1-2)  Weeks:  (4-6)     Therapy Time   Individual Concurrent Group Co-treatment   Time In  1100         Time Out  1145         Minutes  [address], PTA  License and [address] Number: [id number]

## 2022-04-11 ENCOUNTER — HOSPITAL ENCOUNTER (OUTPATIENT)
Dept: PHYSICAL THERAPY | Age: 72
Setting detail: THERAPIES SERIES
Discharge: HOME | End: 2022-04-11
Payer: MEDICARE

## 2022-04-11 PROCEDURE — 97116 GAIT TRAINING THERAPY: CPT

## 2022-04-11 PROCEDURE — 97110 THERAPEUTIC EXERCISES: CPT

## 2022-04-11 NOTE — PROGRESS NOTES
Physical Therapy  Daily Treatment Note  Date: 2022  Patient Name: Sonia Gamez  MRN: [id number]     :   1950    Treatment Diagnosis: Cervical Myelopathy with ataxic gait; Back stiffness and falls risk; unsteady gait    Subjective:   General  Chart Reviewed: Yes  Additional Pertinent Hx: Pt report not having any neck pain but having NT in both hands; Pt reports having increased back stiffness and some trouble getting in/out of chair; Reports having increase trouble when right knee so uses a cane at times and increase trouble with his gait. 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 Family / Caregiver Present: No  Referring Practitioner: Dr Sanderson  PT Visit Information  Onset Date: 21  PT Insurance Information: Approval for 12 visits from 22 to 3/20/22; Approval for an additonal 12 visits on 3/22/22 to 22   Total # of Visits Approved: 20 (11 current plus additional visits approved= 20 visits )  Total # of Visits to Date: 15  Plan of Care/Certification Expiration Date: 22 (Approval from insurance good until 22 )  No Show: 0  Canceled Appointment: 1  Subjective  Subjective: Pt. states he has mild R knee stiffness denies pain. Pt. amb to therapy with SPC. Denies fall or LOB. Pain Screening  Patient Currently in Pain: No  Vital Signs  Patient Currently in Pain: No       Treatment Activities:                  Ambulation  Ambulation?: Yes  Ambulation 1  Surface: level tile;carpet  Device: Single point cane  Assistance: Stand by assistance  Quality of Gait: PtGladys curiel dec step length and heel strike however not scuffing beet VC for inc step length and heel strike limited follow through.     Distance: 220'                   Exercises  Exercise 8: Mini squats x 20 H3-5'' (VC's and demo for proper form )  Exercise 10: standing hip ABD; H3-5'' 2x 10 3#   VC for QS  Exercise 11: standing hip extension; H5'' 2x 10 3#  Exercise 12: standing march; H5'' x 20 3#  Exercise 16: forward and retro ambulation at rail focus on step width and heel toe pattern 4 x 6'   Exercise 17: seated hamstring stretch; H30'' x 3                                    Assessment:   Conditions Requiring Skilled Therapeutic Intervention  Body structures, Functions, Activity limitations: Decreased functional mobility ; Decreased endurance;Decreased balance;Decreased strength  Assessment: Pt. still limited with heel strike and step length all though no LOB and able to clear feet while ambulating with SPC. Cont to focus on strengthenig and gait to improve functional mobility. Tolerates inc reps MORALES per grid with occ seated and standing RB. Cont to recommend SPC with gait for imporved balance and stability. Treatment Diagnosis: Cervical Myelopathy with ataxic gait; Back stiffness and falls risk; unsteady gait  REQUIRES PT FOLLOW UP: Yes        Goals:  Short term goals  Time Frame for Short term goals: 1-2 weeks from date of evaluation  Short term goal 1: Pt reports able to complete his HEP 3-5x per week (2x per week )  Short term goal 2: Pt using his cane when coming to./from PT with safer gait pattern (improved heel strike and step length)-GOAL MET (Goal met )  Long term goals  Time Frame for Long term goals : 4-6 weeks  Long term goal 1: Pt reports pain as 2/10 or less with increased actvitiy and ADLs (Progressing with his goal )  Long term goal 2:  Increase core and B LE strength to 4/5 to 4+/5 so that pt can ambulate further distances before becoming fatigued/unsteady (Progressing towards goal )  Long term goal 3: Pt able to ambulate with AD with improvement in pts posture, step length and heel strike for >250'x with Mod indep (Progressing towards goal and distance )  Long term goal 4: Improve pts TUG (Timed up and go test) to <25 seconds or better with AD (35.88, 38,02, 38,15=Average Tug 37.35 - not met )  Long term goal 5: Pt indep with advanced HEP for strength and balance to decrease pts falls risk (Progressing towards goal )  Patient Goals   Patient goals :  To get stronger and be able to move better    Plan:       Frequency and duration of tx  Days:  (1-2)  Weeks:  (4-6)     Therapy Time   Individual Concurrent Group Co-treatment   Time In  1100         Time Out  1145         Minutes  [address], PTA  License and [address] Number: [id number]

## 2022-04-13 ENCOUNTER — HOSPITAL ENCOUNTER (OUTPATIENT)
Dept: PHYSICAL THERAPY | Age: 72
Setting detail: THERAPIES SERIES
Discharge: HOME | End: 2022-04-13
Payer: MEDICARE

## 2022-04-13 PROCEDURE — 97110 THERAPEUTIC EXERCISES: CPT

## 2022-04-13 ASSESSMENT — PAIN SCALES - GENERAL: PAINLEVEL_OUTOF10: 1

## 2022-04-13 ASSESSMENT — PAIN DESCRIPTION - LOCATION: LOCATION: KNEE

## 2022-04-13 ASSESSMENT — PAIN DESCRIPTION - ORIENTATION: ORIENTATION: RIGHT

## 2022-04-13 NOTE — PROGRESS NOTES
Physical Therapy  Daily Treatment Note  Date: 2022  Patient Name: Margy Looney  MRN: [id number]     :   1950      Treatment Diagnosis: Cervical Myelopathy with ataxic gait; Back stiffness and falls risk; unsteady gait    Subjective:   General  Chart Reviewed: Yes  Additional Pertinent Hx: Pt report not having any neck pain but having NT in both hands; Pt reports having increased back stiffness and some trouble getting in/out of chair; Reports having increase trouble when right knee so uses a cane at times and increase trouble with his gait. 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 Family / Caregiver Present: No  Referring Practitioner: Dr Sanderson  PT Visit Information  Onset Date: 21  PT Insurance Information: Approval for 12 visits from 22 to 3/20/22; Approval for an additonal 12 visits on 3/22/22 to 22   Total # of Visits Approved: 20 (11 current plus additional visits approved= 20 visits )  Total # of Visits to Date: 15  Plan of Care/Certification Expiration Date: 22 (Approval from insurance good until 22 )  No Show: 0  Canceled Appointment: 1  Subjective  Subjective: Pt. states alittle pain in R knee. Pain Screening  Patient Currently in Pain: Yes  Pain Assessment  Pain Assessment: 0-10  Pain Level: 1  Pain Location: Knee  Pain Orientation: Right  Vital Signs  Patient Currently in Pain: Yes  Patient Observation  Observations: Pt. clammy to touch and reports feeling warm BP taken in supine 131/88 mmHg.          Treatment Activities:                  Ambulation  Ambulation?: No                   Exercises  Exercise 1: BLE QS x 10 hold 3-5 sec  (R knee lacking 12 deg from neutral, L -2 hyperextension. )  Exercise 2: SLR 2 x10 hold 3 sec control lower VC for QS   Exercise 3: Bridge x 20 hold 3sec   Exercise 4: Hooklying hip ABD BTB x15 hold 3-5 sec   Exercise 5: Supine HS stretch 30 sec x 2   Exercise 6: Nautilus resited gait fwd x 1 lap and 1/2 lap retro 20# with CGA to Min A had to stop due to sensation of R knee wanting to buckle deferred further     Modalities  Cryotherapy (Minutes\Location): CP to R knee post to dec pain and infalmmation                              Assessment:   Conditions Requiring Skilled Therapeutic Intervention  Body structures, Functions, Activity limitations: Decreased functional mobility ; Decreased endurance;Decreased balance;Decreased strength  Assessment: Added QS to HEP along with HS stretch in supine as pt. cont to amb with knees flexed positon and mild pain R knee. Pt. lacking 12 deg of ext in L knee. HO issued. Also cont with strengthening per grid to imporve functional mobility. Attempted Nautilus resisted gait to inc strength and challange balance. Unable to complete R knee wanting to buckle x 2 so pt. sat and deferred further activity. R knee swollen. Encouraged and applied CP for pain releif and to dec inflammation. Recommended to pt. WW/Rollator for gait to imporve gait and balance and dec strain on R knee. Pt. declines. \"I don't like to use it. \"   Treatment Diagnosis: Cervical Myelopathy with ataxic gait; Back stiffness and falls risk; unsteady gait  Prognosis: Good  REQUIRES PT FOLLOW UP: Yes        Goals:  Short term goals  Time Frame for Short term goals: 1-2 weeks from date of evaluation  Short term goal 1: Pt reports able to complete his HEP 3-5x per week (2x per week )  Short term goal 2: Pt using his cane when coming to./from PT with safer gait pattern (improved heel strike and step length)-GOAL MET (Goal met )  Long term goals  Time Frame for Long term goals : 4-6 weeks  Long term goal 1: Pt reports pain as 2/10 or less with increased actvitiy and ADLs (Progressing with his goal )  Long term goal 2:  Increase core and B LE strength to 4/5 to 4+/5 so that pt can ambulate further distances before becoming fatigued/unsteady (Progressing towards goal )  Long term goal 3: Pt able to ambulate with AD with improvement in pts posture, step length and heel strike for >250'x with Mod indep (Progressing towards goal and distance )  Long term goal 4: Improve pts TUG (Timed up and go test) to <25 seconds or better with AD (35.88, 38,02, 38,15=Average Tug 37.35 - not met )  Long term goal 5: Pt indep with advanced HEP for strength and balance to decrease pts falls risk (Progressing towards goal )  Patient Goals   Patient goals :  To get stronger and be able to move better    Plan:       Frequency and duration of tx  Days:  (1-2)  Weeks:  (4-6)     Therapy Time   Individual Concurrent Group Co-treatment   Time In  1100         Time Out  1145         Minutes  [address], PTA  License and [address] Number: [id number]

## 2022-04-18 ENCOUNTER — HOSPITAL ENCOUNTER (OUTPATIENT)
Dept: PHYSICAL THERAPY | Age: 72
Setting detail: THERAPIES SERIES
Discharge: HOME | End: 2022-04-18
Payer: MEDICARE

## 2022-04-18 PROCEDURE — 97110 THERAPEUTIC EXERCISES: CPT

## 2022-04-18 NOTE — PROGRESS NOTES
Physical Therapy  Daily Treatment Note  Date: 2022  Patient Name: Ny Franco  MRN: [id number]     :   1950    Subjective:   General  Chart Reviewed: Yes  Additional Pertinent Hx: Pt report not having any neck pain but having NT in both hands; Pt reports having increased back stiffness and some trouble getting in/out of chair; Reports having increase trouble when right knee so uses a cane at times and increase trouble with his gait. 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 Family / Caregiver Present: No  Referring Practitioner: Dr Sanderson  PT Visit Information  Onset Date: 21  PT Insurance Information: Approval for 12 visits from 22 to 3/20/22; Approval for an additonal 12 visits on 3/22/22 to 22   Total # of Visits Approved: 20  Total # of Visits to Date: 15  Plan of Care/Certification Expiration Date: 22  No Show: 0  Canceled Appointment: 1  Subjective  Subjective: Computers down-see paper note this date.            Treatment Activities:                                                                          Assessment:          G-Code:     OutComes Score                                                     Goals:       Plan:     Recommend D/C to HEP at next recheck         Therapy Time   Individual Concurrent Group Co-treatment   Time In  1100         Time Out  1145         Minutes  [address], PTA  License and [address] Number: [id number]

## 2022-04-20 ENCOUNTER — HOSPITAL ENCOUNTER (OUTPATIENT)
Dept: PHYSICAL THERAPY | Age: 72
Setting detail: THERAPIES SERIES
Discharge: HOME | End: 2022-04-20
Payer: MEDICARE

## 2022-04-20 PROCEDURE — 97116 GAIT TRAINING THERAPY: CPT

## 2022-04-20 PROCEDURE — 97110 THERAPEUTIC EXERCISES: CPT

## 2022-04-20 NOTE — PROGRESS NOTES
Physical Therapy  Initial Assessment  Date: 2022  Patient Name: Rama Islas  MRN: [id number]  : 1950          Restrictions       Subjective   General  Additional Pertinent Hx: Pt report not having any neck pain but having NT in both hands; Pt reports having increased back stiffness and some trouble getting in/out of chair; Reports having increase trouble when right knee so uses a cane at times and increase trouble with his gait. 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 Diagnosis: Cervical myelopathy with ataxic gait; Back stiffness and falls risk  PT Visit Information  Onset Date: 21  PT Insurance Information: Approval for 12 visits from 22 to 3/20/22; Approval for an additonal 12 visits on 3/22/22 to 22   Total # of Visits Approved: 20  Total # of Visits to Date: 12  Plan of Care/Certification Expiration Date: 22  No Show: 0  Canceled Appointment: 1  Subjective  Subjective: Computers down-see paper note this date. Vision/Hearing       Orientation       Social History       Functional Status       Objective                   Bed Mobility:  Supine <-> sit: stand by assistance/ contact guard assistance. Transfers  Sit to Stand: Supervision  Stand to sit: Supervision       Ambulation  Surface: level tile;carpet  Device: Single point cane  Assistance: Stand by assistance  Quality of Gait: Pt. demos dec step length and heel strike however not scuffing beet VC for inc step length and heel strike limited follow through. Gait Deviations: Slow Raissa;Decreased step length;Shuffles  Distance: 440'   Comments: Mild SOB   More Ambulation?: Yes  Ambulation 2  Surface - 2: level tile  Device 2: Single point cane  Comments: TUG  25.29 sec, 27.19 sec, 30.69 sec      Exercises  Exercise 1: (P) BLE QS x 10 hold 3-5 sec , visual, tactile, and verbal cues.   Exercise 2: SLR 2 x10 hold 3 sec control lower VC for QS   Exercise 4: Supine Hooklying hip ABD BTB x15 hold 3-5 sec   Exercise 5: Seated HS stretch 30 sec x 3  Exercise 6: Standing TKE GTB 5 sec hold x 10'                       Assessment   Conditions Requiring Skilled Therapeutic Intervention  Body Structures, Functions, Activity Limitations Requiring Skilled Therapeutic Intervention: (P) Decreased functional mobility ; Decreased endurance;Decreased balance;Decreased strength  Assessment: (P) Pt. tolerated encouragiong pt to increase gait distance this date with demonstrating increase SOB at end of distance. SpO2 98% HR increases to 107 bpm post increase distance. Pt. requires seated rest to reduce exertion. Pt. followed with Tug training without incident. Focus on LE strengthening with good tolerance with requiring tactile and verbal cues to correct and improve form to benefit with ROM and strengthening. Reviewed HEP. Plan   Plan  Plan weeks: (P) 4-6    G-Code       OutComes Score                                                  AM-PAC Score             Goals  Short Term Goals  Time Frame for Short term goals: 1-2 weeks from date of evaluation  Short term goal 1: Pt reports able to complete his HEP 3-5x per week  Short term goal 2: Pt using his cane when coming to./from PT with safer gait pattern (improved heel strike and step length)-GOAL MET  Long Term Goals  Time Frame for Long term goals : 4-6 weeks  Long term goal 1: Pt reports pain as 2/10 or less with increased actvitiy and ADLs  Long term goal 2:  Increase core and B LE strength to 4/5 to 4+/5 so that pt can ambulate further distances before becoming fatigued/unsteady  Long term goal 3: Pt able to ambulate with AD with improvement in pts posture, step length and heel strike for >250'x with Mod indep  Long term goal 4: Improve pts TUG (Timed up and go test) to <25 seconds or better with AD  Long term goal 5: Pt indep with advanced HEP for strength and balance to decrease pts falls risk  Patient Goals   Patient goals : To get stronger and be able to move better       Therapy Time   Individual Concurrent Group Co-treatment   Time In  300         Time Out  355         Minutes  [address], PTA  License and [id number] Number: [phone]

## 2022-04-25 ENCOUNTER — HOSPITAL ENCOUNTER (OUTPATIENT)
Dept: PHYSICAL THERAPY | Age: 72
Setting detail: THERAPIES SERIES
Discharge: HOME | End: 2022-04-25
Payer: MEDICARE

## 2022-04-25 PROCEDURE — 97530 THERAPEUTIC ACTIVITIES: CPT

## 2022-04-25 PROCEDURE — 97116 GAIT TRAINING THERAPY: CPT

## 2022-04-25 ASSESSMENT — PAIN SCALES - GENERAL: PAINLEVEL_OUTOF10: 0

## 2022-04-25 NOTE — PROGRESS NOTES
Physical Therapy  Discharge Note  Date: 2022  Patient Name: Yulisa Bruce  MRN: [id number]     :   1950    Subjective:   General  Chart Reviewed: Yes  Additional Pertinent Hx: Pt report not having any neck pain but having NT in both hands; Pt reports having increased back stiffness and some trouble getting in/out of chair; Reports having increase trouble when right knee so uses a cane at times and increase trouble with his gait. 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 Family / Caregiver Present: No  Referring Practitioner: Dr Sanderson  PT Visit Information  Onset Date: 21  PT Insurance Information: Approval for 12 visits from 22 to 3/20/22; Approval for an additonal 12 visits on 3/22/22 to 22   Total # of Visits Approved: 20  Total # of Visits to Date: 17  Plan of Care/Certification Expiration Date: 22  No Show: 0  Canceled Appointment: 1  Subjective  Subjective: Pt reports not having any pain. Comment: DC'd today with HEP   General Comment  Comments: DC completed on 22   Pain Screening  Patient Currently in Pain: Denies  Pain Assessment: 0-10  Pain Level: 0       Treatment Activities:                  Ambulation  Surface: level tile;carpet  Device: Single point cane  Assistance: Stand by assistance  Quality of Gait: Pt. moisés dec step length B LE with improved heel strike at beginning of the therapy and once pt become tired he ambulated more onto his toes.    Gait Deviations: Slow Raissa;Decreased step length;Shuffles  Distance: 250' x 2   Comments: Increased SOB with approx 250' of gait   More Ambulation?: Yes           Spine  Special Tests: TUG at AL was 28.05;(Tug was average 30 on eval)   Strength RLE  R Hip Flexion: 4+/5  R Hip Extension: 4+/5  R Hip ABduction: 4+/5  R Hip ADduction: 4+/5  R Knee Flexion: 4+/5  R Knee Extension: 4+/5  R Ankle Dorsiflexion: 4/5  R Ankle Plantar flexion: 4/5  Strength LLE  L Hip Flexion: ambulate further distances before becoming fatigued/unsteady  LTG Goal 2 Status[de-identified] Partially met  Long term goal 3: Pt able to ambulate with AD with improvement in pts posture, step length and heel strike for >250'x with Mod indep  LTG Goal 3 Status[de-identified] Met  Long term goal 4: Improve pts TUG (Timed up and go test) to <25 seconds or better with AD  LTG Goal 4 Status[de-identified] Not Met (At DC TUG was 28.05)  Long term goal 5: Pt indep with advanced HEP for strength and balance to decrease pts falls risk  LTG Goal 5 Status[de-identified] Met (Able to do his exercises at home but only does 2x/wk)  Patient Goals   Patient goals :  To get stronger and be able to move better    Plan:    DC to HEP   Therapy Time   Individual Concurrent Group Co-treatment   Time In  11:20am          Time Out  12:00pm         Minutes  40 min                  Kentrell García PT   Therapy License Number: [id number]

## 2022-04-27 ENCOUNTER — APPOINTMENT (OUTPATIENT)
Dept: PHYSICAL THERAPY | Age: 72
End: 2022-04-27
Payer: MEDICARE

## 2022-10-13 ENCOUNTER — HOSPITAL ENCOUNTER (OUTPATIENT)
Dept: LAB | Age: 72
Discharge: HOME | End: 2022-10-13
Payer: MEDICARE

## 2022-10-13 LAB
ALBUMIN SERPL-MCNC: 4.8 G/DL (ref 3.5–4.6)
ALP BLD-CCNC: 143 U/L (ref 35–104)
ALT SERPL-CCNC: 13 U/L (ref 0–41)
ANION GAP SERPL CALCULATED.3IONS-SCNC: 17 MEQ/L (ref 9–15)
AST SERPL-CCNC: 17 U/L (ref 0–40)
BILIRUB SERPL-MCNC: 1.5 MG/DL (ref 0.2–0.7)
BUN BLDV-MCNC: 18 MG/DL (ref 8–23)
CALCIUM SERPL-MCNC: 9.7 MG/DL (ref 8.5–9.9)
CHLORIDE BLD-SCNC: 101 MEQ/L (ref 95–107)
CHOLESTEROL, TOTAL: 173 MG/DL (ref 0–199)
CO2: 20 MEQ/L (ref 20–31)
CREAT SERPL-MCNC: 0.95 MG/DL (ref 0.7–1.2)
GFR AFRICAN AMERICAN: >60
GFR NON-AFRICAN AMERICAN: >60
GLOBULIN: 3.6 G/DL (ref 2.3–3.5)
GLUCOSE BLD-MCNC: 90 MG/DL (ref 70–99)
HDLC SERPL-MCNC: 59 MG/DL (ref 40–59)
LDL CHOLESTEROL CALCULATED: 94 MG/DL (ref 0–129)
POTASSIUM SERPL-SCNC: 3.8 MEQ/L (ref 3.4–4.9)
SODIUM BLD-SCNC: 138 MEQ/L (ref 135–144)
TOTAL PROTEIN: 8.4 G/DL (ref 6.3–8)
TRIGL SERPL-MCNC: 100 MG/DL (ref 0–150)

## 2022-10-13 PROCEDURE — 80061 LIPID PANEL: CPT

## 2022-10-13 PROCEDURE — 80053 COMPREHEN METABOLIC PANEL: CPT

## 2022-10-13 PROCEDURE — 36415 COLL VENOUS BLD VENIPUNCTURE: CPT

## 2022-11-16 RX ORDER — POLYETHYLENE GLYCOL 3350, SODIUM CHLORIDE, SODIUM BICARBONATE, POTASSIUM CHLORIDE 420; 11.2; 5.72; 1.48 G/4L; G/4L; G/4L; G/4L
4000 POWDER, FOR SOLUTION ORAL ONCE
Qty: 4000 ML | Refills: 0 | Status: SHIPPED | OUTPATIENT
Start: 2022-11-16 | End: 2022-11-16

## 2022-12-14 ENCOUNTER — HOSPITAL ENCOUNTER (OUTPATIENT)
Age: 72
Setting detail: OUTPATIENT SURGERY
Discharge: HOME | End: 2022-12-14
Attending: SPECIALIST | Admitting: SPECIALIST
Payer: MEDICARE

## 2022-12-14 ENCOUNTER — ANESTHESIA EVENT (OUTPATIENT)
Dept: OPERATING ROOM | Age: 72
End: 2022-12-14
Payer: MEDICARE

## 2022-12-14 ENCOUNTER — ANESTHESIA (OUTPATIENT)
Dept: OPERATING ROOM | Age: 72
End: 2022-12-14
Payer: MEDICARE

## 2022-12-14 VITALS
TEMPERATURE: 99.4 F | SYSTOLIC BLOOD PRESSURE: 111 MMHG | WEIGHT: 174 LBS | HEIGHT: 66 IN | BODY MASS INDEX: 27.97 KG/M2 | HEART RATE: 82 BPM | OXYGEN SATURATION: 95 % | RESPIRATION RATE: 18 BRPM | DIASTOLIC BLOOD PRESSURE: 80 MMHG

## 2022-12-14 PROBLEM — Z86.0101 H/O ADENOMATOUS POLYP OF COLON: Status: ACTIVE | Noted: 2022-12-14

## 2022-12-14 PROBLEM — Z86.010 H/O ADENOMATOUS POLYP OF COLON: Status: ACTIVE | Noted: 2022-12-14

## 2022-12-14 PROCEDURE — 2580000003 HC RX 258

## 2022-12-14 PROCEDURE — 6360000002 HC RX W HCPCS: Performed by: NURSE ANESTHETIST, CERTIFIED REGISTERED

## 2022-12-14 PROCEDURE — 7100000010 HC PHASE II RECOVERY - FIRST 15 MIN: Performed by: SPECIALIST

## 2022-12-14 PROCEDURE — 3700000000 HC ANESTHESIA ATTENDED CARE: Performed by: SPECIALIST

## 2022-12-14 PROCEDURE — 3609027000 HC COLONOSCOPY: Performed by: SPECIALIST

## 2022-12-14 PROCEDURE — 6370000000 HC RX 637 (ALT 250 FOR IP): Performed by: SPECIALIST

## 2022-12-14 PROCEDURE — 2500000003 HC RX 250 WO HCPCS: Performed by: NURSE ANESTHETIST, CERTIFIED REGISTERED

## 2022-12-14 PROCEDURE — G0105 COLORECTAL SCRN; HI RISK IND: HCPCS | Performed by: SPECIALIST

## 2022-12-14 PROCEDURE — 7100000011 HC PHASE II RECOVERY - ADDTL 15 MIN: Performed by: SPECIALIST

## 2022-12-14 PROCEDURE — 2580000003 HC RX 258: Performed by: SPECIALIST

## 2022-12-14 PROCEDURE — 3700000001 HC ADD 15 MINUTES (ANESTHESIA): Performed by: SPECIALIST

## 2022-12-14 PROCEDURE — 2709999900 HC NON-CHARGEABLE SUPPLY: Performed by: SPECIALIST

## 2022-12-14 RX ORDER — SIMETHICONE 20 MG/.3ML
EMULSION ORAL PRN
Status: DISCONTINUED | OUTPATIENT
Start: 2022-12-14 | End: 2022-12-14 | Stop reason: ALTCHOICE

## 2022-12-14 RX ORDER — SODIUM CHLORIDE, SODIUM LACTATE, POTASSIUM CHLORIDE, CALCIUM CHLORIDE 600; 310; 30; 20 MG/100ML; MG/100ML; MG/100ML; MG/100ML
500 INJECTION, SOLUTION INTRAVENOUS CONTINUOUS
Status: DISCONTINUED | OUTPATIENT
Start: 2022-12-14 | End: 2022-12-14 | Stop reason: HOSPADM

## 2022-12-14 RX ORDER — SODIUM CHLORIDE, SODIUM LACTATE, POTASSIUM CHLORIDE, CALCIUM CHLORIDE 600; 310; 30; 20 MG/100ML; MG/100ML; MG/100ML; MG/100ML
INJECTION, SOLUTION INTRAVENOUS
Status: COMPLETED
Start: 2022-12-14 | End: 2022-12-14

## 2022-12-14 RX ORDER — LIDOCAINE HYDROCHLORIDE 10 MG/ML
INJECTION, SOLUTION INFILTRATION; PERINEURAL PRN
Status: DISCONTINUED | OUTPATIENT
Start: 2022-12-14 | End: 2022-12-14 | Stop reason: SDUPTHER

## 2022-12-14 RX ORDER — MAGNESIUM HYDROXIDE 1200 MG/15ML
LIQUID ORAL PRN
Status: DISCONTINUED | OUTPATIENT
Start: 2022-12-14 | End: 2022-12-14 | Stop reason: ALTCHOICE

## 2022-12-14 RX ORDER — PROPOFOL 10 MG/ML
INJECTION, EMULSION INTRAVENOUS PRN
Status: DISCONTINUED | OUTPATIENT
Start: 2022-12-14 | End: 2022-12-14 | Stop reason: SDUPTHER

## 2022-12-14 RX ADMIN — PROPOFOL 100 MG: 10 INJECTION, EMULSION INTRAVENOUS at 11:29

## 2022-12-14 RX ADMIN — PROPOFOL 50 MG: 10 INJECTION, EMULSION INTRAVENOUS at 11:39

## 2022-12-14 RX ADMIN — PROPOFOL 50 MG: 10 INJECTION, EMULSION INTRAVENOUS at 11:34

## 2022-12-14 RX ADMIN — SODIUM CHLORIDE, SODIUM LACTATE, POTASSIUM CHLORIDE, CALCIUM CHLORIDE 500 ML: 600; 310; 30; 20 INJECTION, SOLUTION INTRAVENOUS at 11:12

## 2022-12-14 RX ADMIN — SODIUM CHLORIDE, POTASSIUM CHLORIDE, SODIUM LACTATE AND CALCIUM CHLORIDE 500 ML: 600; 310; 30; 20 INJECTION, SOLUTION INTRAVENOUS at 11:12

## 2022-12-14 RX ADMIN — LIDOCAINE HYDROCHLORIDE 20 MG: 10 INJECTION, SOLUTION INFILTRATION; PERINEURAL at 11:29

## 2022-12-14 ASSESSMENT — PAIN SCALES - GENERAL
PAINLEVEL_OUTOF10: 0

## 2022-12-14 ASSESSMENT — PAIN - FUNCTIONAL ASSESSMENT: PAIN_FUNCTIONAL_ASSESSMENT: 0-10

## 2022-12-14 NOTE — ANESTHESIA PRE PROCEDURE
Department of Anesthesiology  Preprocedure Note       Name:  Rere Lees   Age:  67 y.o.  :  1950                                          MRN:  [id number]         Date:  2022      Surgeon: Kadeem Inman):  Rosy Simon MD    Procedure: Procedure(s):  COLORECTAL CANCER SCREENING, HIGH RISK    Medications prior to admission:   Prior to Admission medications    Medication Sig Start Date End Date Taking? Authorizing Provider   baclofen (LIORESAL) 10 MG tablet TAKE 1 TABLET BY MOUTH 2 TIMES DAILY AS NEEDED (STIFFNESS IN LEGS) 22   Xavier Sanderson MD   rosuvastatin (CRESTOR) 20 MG tablet  10/24/21   Historical Provider, MD   amLODIPine (NORVASC) 5 MG tablet TAKE 1 TABLET BY MOUTH EVERY DAY 21   Historical Provider, MD   lisinopril (PRINIVIL;ZESTRIL) 10 MG tablet TAKE 1 TABLET BY MOUTH EVERY DAY 21   Historical Provider, MD   ibuprofen (ADVIL;MOTRIN) 600 MG tablet Take 1 tablet by mouth every 8 hours as needed for Pain 21   Jeronimo Mcpherson MD   neomycin-bacitracin-polymyxin (NEOSPORIN) 5-400-5000 ointment Apply topically 4 times daily.  18   Lu Chavez MD   losartan (COZAAR) 50 MG tablet Take 50 mg by mouth daily    Historical Provider, MD   lovastatin (MEVACOR) 40 MG tablet Take 40 mg by mouth nightly    Historical Provider, MD   Multiple Vitamins-Minerals (ONE-A-DAY MENS 50+ ADVANTAGE PO) Take by mouth    Historical Provider, MD   Pyridoxine HCl (VITAMIN B-6) 100 MG tablet Take 100 mg by mouth daily    Historical Provider, MD       Current medications:    Current Facility-Administered Medications   Medication Dose Route Frequency Provider Last Rate Last Admin    lactated ringers infusion                Allergies:  No Known Allergies    Problem List:    Patient Active Problem List   Diagnosis Code    Hip joint replacement by other means Z96.649    Primary osteoarthritis M19.91    Benign hypertension I10    Carpal tunnel syndrome G56.00    History of total left hip replacement G59.714    Hyperlipidemia E78.5    Ataxic gait R26.0    Lumbar back pain M54.50    Arthralgia M25.50       Past Medical History:        Diagnosis Date    Hyperlipidemia     Hypertension        Past Surgical History:        Procedure Laterality Date    COLONOSCOPY      FINGER AMPUTATION Left 1/2/2019    LEFT HAND: LEFT LONG FINGER AMPUTATION THROUGH DISTAL INTERPHALANGEAL JOINT DIGITAL BLOCK C-ARM MAC+LOCAL (PAT ON ADMIT)   performed by Yonis Pak DO at [address] Left     hip '08    DE COLON CA SCRN NOT  [address]  IND N/A 6/27/2017    COLONOSCOPY performed by Gill [facility] MD at [facility]       Social History:    Social History     Tobacco Use    Smoking status: Former     Types: Pipe     Quit date: 2/8/2019     Years since quitting: 3.8    Smokeless tobacco: Never   Substance Use Topics    Alcohol use: No                                Counseling given: Not Answered      Vital Signs (Current): There were no vitals filed for this visit.                                            BP Readings from Last 3 Encounters:   01/12/22 136/82   10/07/21 130/76   07/19/21 (!) 154/97       NPO Status:                                                                                 BMI:   Wt Readings from Last 3 Encounters:   01/12/22 175 lb (79.4 kg)   10/07/21 153 lb (69.4 kg)   07/19/21 155 lb (70.3 kg)     There is no height or weight on file to calculate BMI.    CBC:   Lab Results   Component Value Date/Time    WBC 9.3 12/02/2018 03:59 PM    RBC 4.66 12/02/2018 03:59 PM    RBC 4.66 02/14/2012 09:14 AM    HGB 14.0 12/02/2018 03:59 PM    HCT 42.9 12/02/2018 03:59 PM    MCV 92.2 12/02/2018 03:59 PM    RDW 14.8 12/02/2018 03:59 PM     12/02/2018 03:59 PM       CMP:   Lab Results   Component Value Date/Time     10/13/2022 07:34 AM    K 3.8 10/13/2022 07:34 AM     10/13/2022 07:34 AM    CO2 20 10/13/2022 07:34 AM    BUN 18 10/13/2022 07:34 AM    CREATININE 0.95 10/13/2022 07:34 AM    GFRAA >60.0 10/13/2022 07:34 AM    LABGLOM >60.0 10/13/2022 07:34 AM    GLUCOSE 90 10/13/2022 07:34 AM    GLUCOSE 93 02/14/2012 09:14 AM    PROT 8.4 10/13/2022 07:34 AM    CALCIUM 9.7 10/13/2022 07:34 AM    BILITOT 1.5 10/13/2022 07:34 AM    ALKPHOS 143 10/13/2022 07:34 AM    AST 17 10/13/2022 07:34 AM    ALT 13 10/13/2022 07:34 AM       POC Tests: No results for input(s): POCGLU, POCNA, POCK, POCCL, POCBUN, POCHEMO, POCHCT in the last 72 hours. Coags: No results found for: PROTIME, INR, APTT    HCG (If Applicable): No results found for: PREGTESTUR, PREGSERUM, HCG, HCGQUANT     ABGs: No results found for: PHART, PO2ART, IHH6IHS, PAM8IZQ, BEART, E2RKKWZB     Type & Screen (If Applicable):  No results found for: LABABO, LABRH    Drug/Infectious Status (If Applicable):  No results found for: HIV, HEPCAB    COVID-19 Screening (If Applicable): No results found for: COVID19        Anesthesia Evaluation  Patient summary reviewed and Nursing notes reviewed  Airway: Mallampati: II  TM distance: >3 FB   Neck ROM: full  Mouth opening: > = 3 FB   Dental:          Pulmonary:Negative Pulmonary ROS and normal exam                               Cardiovascular:    (+) hypertension:, hyperlipidemia                  Neuro/Psych:   Negative Neuro/Psych ROS              GI/Hepatic/Renal: Neg GI/Hepatic/Renal ROS            Endo/Other: Negative Endo/Other ROS                    Abdominal:             Vascular: negative vascular ROS. Other Findings:           Anesthesia Plan      MAC     ASA 2       Induction: intravenous. Anesthetic plan and risks discussed with patient.       Plan discussed with surgical team.                    BLANKA Way - CRNA   12/14/2022

## 2022-12-14 NOTE — ANESTHESIA POSTPROCEDURE EVALUATION
Department of Anesthesiology  Postprocedure Note    Patient: Nichol Souza  MRN: [id number]  YOB: 1950  Date of evaluation: 12/14/2022      Procedure Summary     Date: 12/14/22 Room / Location: [address] [place] [address]    Anesthesia Start: 2636 Anesthesia Stop: 6302    Procedure: COLORECTAL CANCER SCREENING, HIGH RISK (Anus) Diagnosis:       History of colon polyps      (History of colon polyps [Z86.010])    Surgeons: Andrés Greenwood MD Responsible Provider: BLANKA Fontaine CRNA    Anesthesia Type: MAC ASA Status: 2          Anesthesia Type: No value filed.     Joleen Phase I: Joleen Score: 10    Joleen Phase II:        Anesthesia Post Evaluation    Patient location during evaluation: bedside  Patient participation: complete - patient participated  Level of consciousness: awake  Airway patency: patent  Nausea & Vomiting: no nausea and no vomiting  Complications: no  Cardiovascular status: blood pressure returned to baseline  Respiratory status: acceptable  Hydration status: euvolemic

## 2022-12-14 NOTE — DISCHARGE INSTRUCTIONS
Lower Discharge Instructions    Patient Name: Adama Delarosa  Patient ID: [id number]  YOB: 1950  Procedure: Conception Pares  Referring Physician: [unfilled]  Procedure Date: 12/14/2022    Recommendations:  []   Follow-up appointment with family physician in 4 weeks. [x]   Colonoscopy recommended in 5  years. []   Follow-up appointment with endoscopist in  Reports of your procedure and these recommendations have been sent to:  [unfilled]    Sedative medication given for procedures can slow your reaction time and coordination for many hours. If you receive medications, it is important for your safety to follow the instructions below for the remainder of the day:  BE TAKEN directly home from the center and rest quietly. DO NOT resume normal activities until tomorrow. Do NOT drive, return to work, or operate any machinery or power tools. Do NOT make any important personal or business decisions, sign any legal papers or perform any activity that depends on your full concentration power or mental judgement. Do NOT drink any alcohol or take nerve or sleeping drugs. They add to the effects of the medicine still present in your body.    [] (Checked if a biopsy or polyp removal was performed)  There is a slight risk of bleeding. If you had a biopsy or a polyp removed, we suggest that you follow the instructions below:  Do NOT take aspirin or similar anti-inflammatory medicine for ___ days. Do NOT exercise, jog, or do any heavy lifting or straining for 1 day. Potential common after effects and treatment following the procedure:  Mild abdominal pain, bloating, or excessive gas - rest, eat lightly, and use a heating pad. Redness and/or swelling where the IV was - apply heat and elevate. Symptoms to report to your physician:  Severe abdominal pain or bloating. Chills or fever above 101 degrees occurring within 24 hours after procedure. Large amounts of rectal bleeding that does not stop.  Small amount of rectal bleeding is not serious especially if hemorrhoids are present. Unable to keep down food or drink. IV site stays red and swollen for more than 2 days. In the case of an emergency, please go to the emergency room. If you are not having an emergency but are having some of the above symptoms please call the doctor's office at:  Dr. Ricardo Kirk [phone]   [email or web address]      I have read and understand the above instructions:            ____________________________         ____________________________  Patient or Patient Rep. Signature   Witness Signature      Date: 12/14/2022  Time:

## 2022-12-14 NOTE — H&P
Patient Name: Precious Ott  : 1950  MRN: [id number]  DATE: 22      ENDOSCOPY  History and Physical    Procedure:    [x] Diagnostic Colonoscopy       [] Screening Colonoscopy  [] EGD      [] ERCP      [] EUS       [] Other    [x] Previous office notes/History and Physical reviewed from the patients chart. Please see EMR for further details of HPI. I have examined the patient's status immediately prior to the procedure and:      Indications/HPI:    []Abdominal Pain  []Cancer- GI/Lung  []Fhx of colon CA/polyps  []History of Polyps  []Guys   []Melena  []Abnormal Imaging  []Dysphagia    []Persistent Pneumonia  []Anemia  []Food Impaction  [x]History of Polyps  []GI Bleed  []Pulmonary nodule/Mass  []Change in bowel habits []Heartburn/Reflux  []Rectal Bleed (BRBPR)  []Chest Pain - Non Cardiac []Heme (+) Stoo  l[]Ulcers  []Constipation  []Hemoptysis   []Varices  []Diarrhea  []Hypoxemia  []Nausea/Vomiting  []Screening   []Crohns/Colitis  []Other:     Anesthesia:   [x] MAC [] Moderate Sedation   [] General   [] None     ROS: 12 pt Review of Symptoms was negative unless mentioned above    Medications:   Prior to Admission medications    Medication Sig Start Date End Date Taking? Authorizing Provider   baclofen (LIORESAL) 10 MG tablet TAKE 1 TABLET BY MOUTH 2 TIMES DAILY AS NEEDED (STIFFNESS IN LEGS) 22   Severa Pierre Just, MD   rosuvastatin (CRESTOR) 20 MG tablet  10/24/21   Historical Provider, MD   amLODIPine (NORVASC) 5 MG tablet TAKE 1 TABLET BY MOUTH EVERY DAY 21   Historical Provider, MD   lisinopril (PRINIVIL;ZESTRIL) 10 MG tablet TAKE 1 TABLET BY MOUTH EVERY DAY 21   Historical Provider, MD   ibuprofen (ADVIL;MOTRIN) 600 MG tablet Take 1 tablet by mouth every 8 hours as needed for Pain 21   Carlos Daniel MD   neomycin-bacitracin-polymyxin (NEOSPORIN) 5-400-5000 ointment Apply topically 4 times daily.  18   Suma Kingston MD   losartan (COZAAR) 50 MG tablet Take 50 mg by mouth daily  Patient not taking: Reported on 12/14/2022    Historical Provider, MD   lovastatin (MEVACOR) 40 MG tablet Take 40 mg by mouth nightly    Historical Provider, MD   Multiple Vitamins-Minerals (ONE-A-DAY MENS 50+ ADVANTAGE PO) Take by mouth    Historical Provider, MD   Pyridoxine HCl (VITAMIN B-6) 100 MG tablet Take 100 mg by mouth daily    Historical Provider, MD       Allergies: Allergies   Allergen Reactions    Atorvastatin Other (See Comments)        History of allergic reaction to anesthesia:  No    Past Medical History:  Past Medical History:   Diagnosis Date    Hyperlipidemia     Hypertension        Past Surgical History:  Past Surgical History:   Procedure Laterality Date    COLONOSCOPY      FINGER AMPUTATION Left 1/2/2019    LEFT HAND: LEFT LONG FINGER AMPUTATION THROUGH DISTAL INTERPHALANGEAL JOINT DIGITAL BLOCK C-ARM MAC+LOCAL (PAT ON ADMIT)   performed by Samira Jain DO at [address] [place] [address]. Left     hip '08    MT COLON CA SCRN NOT  [address] [facility] N/A 6/27/2017    COLONOSCOPY performed by Devi Quezada MD at [facility]       Social History:  Social History     Tobacco Use    Smoking status: Former     Types: Pipe     Quit date: 2/8/2019     Years since quitting: 3.8    Smokeless tobacco: Never   Substance Use Topics    Alcohol use: No    Drug use: No       Vital Signs:   Vitals:    12/14/22 1103   BP: (!) 146/84   Pulse: 56   Resp: 18   Temp: 99.4 °F (37.4 °C)   SpO2: 95%        Physical Exam:  Cardiac:  [x]WNL  []Comments:  Pulmonary:  [x]WNL   []Comments:   Neuro/Mental Status:  [x]WNL  []Comments:  Abdominal:  [x]WNL    []Comments:  Other:   []WNL  []Comments:    Informed Consent:  The risks and benefits of the procedure have been discussed with either the patient or if they cannot consent, their representative. Assessment:  Patient examined and appropriate for planned sedation and procedure. Plan:  Proceed with planned sedation and procedure as above.     Felicita Grand Lorena Philippe MD  11:27 AM

## 2023-11-22 ENCOUNTER — HOSPITAL ENCOUNTER (OUTPATIENT)
Dept: LAB | Age: 73
Discharge: HOME | End: 2023-11-22
Payer: COMMERCIAL

## 2023-11-22 LAB
ALBUMIN SERPL-MCNC: 4.5 G/DL (ref 3.5–4.6)
ALP SERPL-CCNC: 102 U/L (ref 35–104)
ALT SERPL-CCNC: 8 U/L (ref 0–41)
ANION GAP SERPL CALCULATED.3IONS-SCNC: 15 MEQ/L (ref 9–15)
AST SERPL-CCNC: 18 U/L (ref 0–40)
BILIRUB SERPL-MCNC: 1.1 MG/DL (ref 0.2–0.7)
BUN SERPL-MCNC: 14 MG/DL (ref 8–23)
CALCIUM SERPL-MCNC: 9.8 MG/DL (ref 8.5–9.9)
CHLORIDE SERPL-SCNC: 107 MEQ/L (ref 95–107)
CHOLEST SERPL-MCNC: 195 MG/DL (ref 0–199)
CO2 SERPL-SCNC: 20 MEQ/L (ref 20–31)
CREAT SERPL-MCNC: 0.9 MG/DL (ref 0.7–1.2)
GLOBULIN SER CALC-MCNC: 3.8 G/DL (ref 2.3–3.5)
GLUCOSE SERPL-MCNC: 98 MG/DL (ref 70–99)
HDLC SERPL-MCNC: 56 MG/DL (ref 40–59)
LDLC SERPL CALC-MCNC: 111 MG/DL (ref 0–129)
POTASSIUM SERPL-SCNC: 4.3 MEQ/L (ref 3.4–4.9)
PROT SERPL-MCNC: 8.3 G/DL (ref 6.3–8)
SODIUM SERPL-SCNC: 142 MEQ/L (ref 135–144)
TRIGL SERPL-MCNC: 140 MG/DL (ref 0–150)

## 2023-11-22 PROCEDURE — 36415 COLL VENOUS BLD VENIPUNCTURE: CPT

## 2023-11-22 PROCEDURE — 80061 LIPID PANEL: CPT

## 2023-11-22 PROCEDURE — 80053 COMPREHEN METABOLIC PANEL: CPT

## 2024-11-23 ENCOUNTER — HOSPITAL ENCOUNTER (OUTPATIENT)
Dept: LAB | Age: 74
Discharge: HOME | End: 2024-11-23
Payer: COMMERCIAL

## 2024-11-23 LAB
ALBUMIN SERPL-MCNC: 4.4 G/DL (ref 3.5–4.6)
ALP SERPL-CCNC: 94 U/L (ref 35–104)
ALT SERPL-CCNC: 14 U/L (ref 0–41)
ANION GAP SERPL CALCULATED.3IONS-SCNC: 15 MEQ/L (ref 9–15)
AST SERPL-CCNC: 20 U/L (ref 0–40)
BILIRUB SERPL-MCNC: 1.8 MG/DL (ref 0.2–0.7)
BUN SERPL-MCNC: 16 MG/DL (ref 8–23)
CALCIUM SERPL-MCNC: 9.5 MG/DL (ref 8.5–9.9)
CHLORIDE SERPL-SCNC: 104 MEQ/L (ref 95–107)
CHOLEST SERPL-MCNC: 189 MG/DL (ref 0–199)
CO2 SERPL-SCNC: 21 MEQ/L (ref 20–31)
CREAT SERPL-MCNC: 0.88 MG/DL (ref 0.7–1.2)
GLOBULIN SER CALC-MCNC: 3.1 G/DL (ref 2.3–3.5)
GLUCOSE SERPL-MCNC: 81 MG/DL (ref 70–99)
HDLC SERPL-MCNC: 65 MG/DL (ref 40–59)
LDLC SERPL CALC-MCNC: 105 MG/DL (ref 0–129)
POTASSIUM SERPL-SCNC: 4.2 MEQ/L (ref 3.4–4.9)
PROT SERPL-MCNC: 7.5 G/DL (ref 6.3–8)
SODIUM SERPL-SCNC: 140 MEQ/L (ref 135–144)
TRIGL SERPL-MCNC: 94 MG/DL (ref 0–150)

## 2024-11-23 PROCEDURE — 36415 COLL VENOUS BLD VENIPUNCTURE: CPT

## 2024-11-23 PROCEDURE — 80061 LIPID PANEL: CPT

## 2024-11-23 PROCEDURE — 80053 COMPREHEN METABOLIC PANEL: CPT

## 2025-07-25 ENCOUNTER — HOSPITAL ENCOUNTER (OUTPATIENT)
Dept: PHYSICAL THERAPY | Age: 75
Setting detail: THERAPIES SERIES
Discharge: HOME | End: 2025-07-25
Payer: COMMERCIAL

## 2025-07-25 PROCEDURE — 97110 THERAPEUTIC EXERCISES: CPT

## 2025-07-25 PROCEDURE — 97162 PT EVAL MOD COMPLEX 30 MIN: CPT

## 2025-07-25 NOTE — PROGRESS NOTES
Physical Therapy: Initial Evaluation    Patient: Emilio Duffy (74 y.o. male)   Examination Date: 2025  Plan of Care Certification Period: 2025 to 25      :  1950 ;    Confirmed: Yes MRN: [id number]  CSN: [id number]   Insurance: Payor: [employer] HE[employer] PLAN / Plan: [employer] HE[employer] PLANS / Product Type: *No Product type* /   Insurance ID: [id number] - (Medicare Managed) Secondary Insurance (if applicable):    Referring Physician: Veronika Yañez MD [place]   PCP: Veronika Yañez MD Visits to Date/Visits Approved: 1 / 10    No Show/Cancelled Appts: 0  0     Medical Diagnosis: Low back pain [M54.50]  Leg weakness [R29.898] LB pain and leg weakness  Treatment Diagnosis: Low back pain and leg weakness     PERTINENT MEDICAL HISTORY   Patient Assessed for Rehabilitation Services: Yes  Self reported health status:: Good    Medical History:     Past Medical History:   Diagnosis Date    Hyperlipidemia     Hypertension      Surgical History:   Past Surgical History:   Procedure Laterality Date    COLONOSCOPY      COLONOSCOPY N/A 2022    COLORECTAL CANCER SCREENING, HIGH RISK performed by Emily Sanchez MD at [facility] OR    FINGER AMPUTATION Left 2019    LEFT HAND: LEFT LONG FINGER AMPUTATION THROUGH DISTAL INTERPHALANGEAL JOINT DIGITAL BLOCK C-ARM MAC+LOCAL (PAT ON ADMIT)   performed by Laci Green DO at [facility] OR    JOINT REPLACEMENT Left     hip '08    PA COLON CA SCRN NOT HI [facility] IND N/A 2017    COLONOSCOPY performed by Emily Sanchez MD at [facility]       Medications:   Current Outpatient Medications:     baclofen (LIORESAL) 10 MG tablet, TAKE 1 TABLET BY MOUTH 2 TIMES DAILY AS NEEDED (STIFFNESS IN LEGS), Disp: 60 tablet, Rfl: 2    rosuvastatin (CRESTOR) 20 MG tablet, , Disp: , Rfl:     amLODIPine (NORVASC) 5 MG tablet, TAKE 1 TABLET BY MOUTH EVERY DAY, Disp: , Rfl:     lisinopril (PRINIVIL;ZESTRIL) 10 MG tablet, TAKE 1 TABLET BY MOUTH EVERY DAY, Disp: , Rfl:     ibuprofen

## 2025-07-28 ENCOUNTER — HOSPITAL ENCOUNTER (OUTPATIENT)
Dept: PHYSICAL THERAPY | Age: 75
Setting detail: THERAPIES SERIES
Discharge: HOME | End: 2025-07-28
Payer: COMMERCIAL

## 2025-07-28 PROCEDURE — 97140 MANUAL THERAPY 1/> REGIONS: CPT

## 2025-07-28 PROCEDURE — 97110 THERAPEUTIC EXERCISES: CPT

## 2025-07-28 ASSESSMENT — PAIN SCALES - GENERAL: PAINLEVEL_OUTOF10: 3

## 2025-07-28 NOTE — PROGRESS NOTES
Physical Therapy: Daily Note   Patient: Emilio Duffy (74 y.o. male)   Examination Date: 2025  Plan of Care/Certification Expiration Date: 25    No data recorded   :  1950 # of Visits since SOC:   2   MRN: [id number]  CSN: [id number] Start of Care Date:   2025   Insurance: Payor: [employer] HE[employer] PLAN / Plan: [employer] HE[employer] PLANS / Product Type: *No Product type* /   Insurance ID: [id number] - (Medicare Managed) Secondary Insurance (if applicable):    Referring Physician: Veronika Yañez MD [place]   PCP: Veronika Yañez MD Visits to Date/Visits Approved: 2 / 10    No Show/Cancelled Appts: 0 / 0     Medical Diagnosis: Low back pain [M54.50]  Leg weakness [R29.898]    Treatment Diagnosis: Low back pain and leg weakness        SUBJECTIVE EXAMINATION   Pain Level: Pain Screening  Patient Currently in Pain: Yes  Pain Assessment: 0-10  Pain Level: 3 (LBP)    Patient Comments: Subjective: Pt reports LBP 3/10. Pt has been performing stretches at home. Pt arrives to therapy with SPC ambulating slowly with flexed knees.    HEP Compliance: Good          TREATMENT     Exercises:      Treatment Reasoning    Exercise 1: *SKTC x 3 H30  Exercise 2: *LTR x 5 H30  Exercise 3: supine HS stretch H30'' x 3  Exercise 4: mini bridge H3'' x 10  Exercise 5: hooklying hip abduction H3'' x 10 BTB                          Manual Therapy: (CPT 84208) Treatment Reasoning     Joint Mobilization: PROM to B hamstrings to dec tightness  Soft Tissue Mobilizaton: MFR/STM to dec tightness/pain in lumbar area  Other: KT tape I strips to lumbar paraspinals with distal to superior side R side to dec tightess and superior to distal pull 50% tension to support and assist with correcting scoliosis. Limitations addressed: Joint motion, Painful spasm                    Pt Education: Additional Comments: KT tape       ASSESSMENT     Assessment: Assessment: Pt progressing with strengthening tolerating addition of bridges and hip abduction.

## 2025-07-31 ENCOUNTER — HOSPITAL ENCOUNTER (OUTPATIENT)
Dept: PHYSICAL THERAPY | Age: 75
Setting detail: THERAPIES SERIES
Discharge: HOME | End: 2025-07-31
Payer: COMMERCIAL

## 2025-07-31 PROCEDURE — 97140 MANUAL THERAPY 1/> REGIONS: CPT

## 2025-07-31 PROCEDURE — 97110 THERAPEUTIC EXERCISES: CPT

## 2025-07-31 ASSESSMENT — PAIN SCALES - GENERAL: PAINLEVEL_OUTOF10: 1

## 2025-07-31 NOTE — PROGRESS NOTES
Physical Therapy: Daily Note   Patient: Emilio Duffy (74 y.o. male)   Examination Date: 2025  Plan of Care/Certification Expiration Date: 25    No data recorded   :  1950 # of Visits since SOC:   3   MRN: [id number]  CSN: [id number] Start of Care Date:   2025   Insurance: Payor: [employer] HEALTH PLAN / Plan: [employer] HEALTH PLANS / Product Type: *No Product type* /   Insurance ID: [id number] - (Medicare Managed) Secondary Insurance (if applicable):    Referring Physician: Veronika Yañez MD [place]   PCP: Veronika Yañez MD Visits to Date/Visits Approved: 3 / 10    No Show/Cancelled Appts: 0 / 0     Medical Diagnosis: Low back pain [M54.50]  Leg weakness [R29.898]    Treatment Diagnosis: Low back pain and leg weakness        SUBJECTIVE EXAMINATION   Pain Level: Pain Screening  Patient Currently in Pain: Yes  Pain Assessment: 0-10  Pain Level: 1 (low back)    Patient Comments: Subjective: Pt reports pain 1/10 in low back. \"It feels better!\" Pt states he can't reach KT tape to remove this weekend and would like therapist to remove today.    HEP Compliance: Good            TREATMENT     Exercises:      Treatment Reasoning    Exercise 2: *LTR x 5 H30  Exercise 3: supine HS stretch H30'' x 3  Exercise 4: mini bridge H5'' x 10  Exercise 5: hooklying hip abduction H3''  2 x 10 GTB  Exercise 6: recumbent bike x 5 min  Exercise 7: abdominal bracing with hooklying march H3'' x 10    Limitations addressed: Mobility, Strength, Flexibility, Activity tolerance, Posture, Pain modulation                     Manual Therapy: (CPT 51820) Treatment Reasoning     Joint Mobilization: PROM to B hamstrings to dec tightness                      Pt Education: Additional Comments: KT tape       ASSESSMENT     Assessment: Assessment: Pt continues to progress with strengthening tolerating inc reps with no c/o pain. Removed KT tape post tx with no irritation. Pain level 0/10 post. Continues with POC.  Body Structures,

## 2025-08-11 ENCOUNTER — HOSPITAL ENCOUNTER (OUTPATIENT)
Dept: PHYSICAL THERAPY | Age: 75
Setting detail: THERAPIES SERIES
Discharge: HOME | End: 2025-08-11
Payer: COMMERCIAL

## 2025-08-11 PROCEDURE — 97110 THERAPEUTIC EXERCISES: CPT

## 2025-08-11 ASSESSMENT — PAIN SCALES - GENERAL: PAINLEVEL_OUTOF10: 2

## 2025-08-18 ENCOUNTER — HOSPITAL ENCOUNTER (OUTPATIENT)
Dept: PHYSICAL THERAPY | Age: 75
Setting detail: THERAPIES SERIES
Discharge: HOME | End: 2025-08-18
Payer: COMMERCIAL

## 2025-08-18 PROCEDURE — 97140 MANUAL THERAPY 1/> REGIONS: CPT

## 2025-08-18 PROCEDURE — 97110 THERAPEUTIC EXERCISES: CPT

## 2025-08-26 ENCOUNTER — HOSPITAL ENCOUNTER (OUTPATIENT)
Dept: PHYSICAL THERAPY | Age: 75
Setting detail: THERAPIES SERIES
Discharge: HOME | End: 2025-08-26
Payer: COMMERCIAL

## 2025-08-26 PROCEDURE — 97110 THERAPEUTIC EXERCISES: CPT

## 2025-08-26 PROCEDURE — 97140 MANUAL THERAPY 1/> REGIONS: CPT

## 2025-08-26 PROCEDURE — 97530 THERAPEUTIC ACTIVITIES: CPT

## 2025-08-26 ASSESSMENT — PAIN SCALES - GENERAL: PAINLEVEL_OUTOF10: 2

## (undated) DEVICE — TRAYS TRANSPORT SCOPE OASIS W/LID

## (undated) DEVICE — TUBE SET 96 MM 64 MM H2O PERISTALTIC STD AUX CHANNEL

## (undated) DEVICE — MEDI-VAC NON-CONDUCTIVE SUCTION TUBING: Brand: CARDINAL HEALTH

## (undated) DEVICE — 4-PORT MANIFOLD: Brand: NEPTUNE 2

## (undated) DEVICE — 1010 S-DRAPE TOWEL DRAPE 10/BX: Brand: STERI-DRAPE™

## (undated) DEVICE — GLOVE SURG SZ 75 L12IN FNGR THK13MIL BRN LTX SYN POLYMER W

## (undated) DEVICE — SYRINGE IRRIG 60ML SFT PLIABLE BLB EZ TO GRP 1 HND USE W/

## (undated) DEVICE — CATHETER SCLERO L240CM NDL 25GA L4MM SHTH DIA2.3MM CNTRST

## (undated) DEVICE — SPONGE GZ W4XL4IN COT 12 PLY TYP VII WVN C FLD DSGN

## (undated) DEVICE — TUBE ENDOSCP COLON CHANNEL

## (undated) DEVICE — LABEL MED MINI W/ MARKER

## (undated) DEVICE — BANDAGE COBAN 2 IN COMPR FOAM 2INX5YD COFLX LF2

## (undated) DEVICE — HAND II: Brand: MEDLINE INDUSTRIES, INC.

## (undated) DEVICE — Z INACTIVE TOURNIQUET L GRN ELAS RNG STRL TOURNI-COT

## (undated) DEVICE — GOWN,AURORA,NONREINFORCED,LARGE: Brand: MEDLINE

## (undated) DEVICE — ENDO CARRY-ON PROCEDURE KIT INCLUDES LUBRICANT, DEFENDO OLYMPUS AIR, WATER, SUCTION, BIOPSY VALVE KIT, ENZYMATIC SPONGE, AND BASIN.: Brand: ENDO CARRY-ON PROCEDURE KIT

## (undated) DEVICE — Device: Brand: ENDO SMARTCAP

## (undated) DEVICE — BANDAGE GZ W2XL75IN ST RAYON POLY CNFRM STRTCH LTWT

## (undated) DEVICE — BW-412T DISP COMBO CLEANING BRUSH: Brand: SINGLE USE COMBINATION CLEANING BRUSH

## (undated) DEVICE — CURITY NON-ADHERENT STRIPS: Brand: CURITY

## (undated) DEVICE — ADAPTER FLSH PMP FLD MGMT GI IRRIG OFP 2 DISPOSABLE

## (undated) DEVICE — CHLORAPREP 26ML ORANGE

## (undated) DEVICE — GLOVE ORANGE PI 7 1/2   MSG9075